# Patient Record
Sex: FEMALE | Race: ASIAN | NOT HISPANIC OR LATINO | Employment: FULL TIME | URBAN - METROPOLITAN AREA
[De-identification: names, ages, dates, MRNs, and addresses within clinical notes are randomized per-mention and may not be internally consistent; named-entity substitution may affect disease eponyms.]

---

## 2017-02-27 ENCOUNTER — TELEPHONE (OUTPATIENT)
Dept: OBSTETRICS AND GYNECOLOGY | Facility: CLINIC | Age: 47
End: 2017-02-27

## 2017-02-27 DIAGNOSIS — Z12.31 VISIT FOR SCREENING MAMMOGRAM: Primary | ICD-10-CM

## 2017-02-27 NOTE — TELEPHONE ENCOUNTER
----- Message from Brendaloco Butler sent at 2/27/2017 12:40 PM CST -----  Contact: self  Pt request her mammogram order to be place so she can get it done on the same day as her annual appointment. Thanks!       Order for screening mammogram in    Keny Helms MD

## 2017-03-01 ENCOUNTER — TELEPHONE (OUTPATIENT)
Dept: OBSTETRICS AND GYNECOLOGY | Facility: CLINIC | Age: 47
End: 2017-03-01

## 2017-03-01 NOTE — TELEPHONE ENCOUNTER
----- Message from Brendaloco Butler sent at 2/27/2017 12:40 PM CST -----  Contact: self  Pt request her mammogram order to be place so she can get it done on the same day as her annual appointment. Thanks!       LM to inform pt appt has been scheduled per request. terri

## 2017-03-08 ENCOUNTER — OFFICE VISIT (OUTPATIENT)
Dept: OBSTETRICS AND GYNECOLOGY | Facility: CLINIC | Age: 47
End: 2017-03-08
Payer: COMMERCIAL

## 2017-03-08 ENCOUNTER — HOSPITAL ENCOUNTER (OUTPATIENT)
Dept: RADIOLOGY | Facility: HOSPITAL | Age: 47
Discharge: HOME | End: 2017-03-08
Attending: OBSTETRICS & GYNECOLOGY
Payer: COMMERCIAL

## 2017-03-08 VITALS
WEIGHT: 151.69 LBS | BODY MASS INDEX: 29.78 KG/M2 | DIASTOLIC BLOOD PRESSURE: 72 MMHG | HEIGHT: 60 IN | SYSTOLIC BLOOD PRESSURE: 130 MMHG | TEMPERATURE: 99 F

## 2017-03-08 DIAGNOSIS — Z01.419 WELL WOMAN EXAM WITH ROUTINE GYNECOLOGICAL EXAM: Primary | ICD-10-CM

## 2017-03-08 DIAGNOSIS — Z12.31 VISIT FOR SCREENING MAMMOGRAM: ICD-10-CM

## 2017-03-08 PROCEDURE — 99396 PREV VISIT EST AGE 40-64: CPT | Mod: S$GLB,,, | Performed by: OBSTETRICS & GYNECOLOGY

## 2017-03-08 PROCEDURE — 77067 SCR MAMMO BI INCL CAD: CPT | Mod: 26,,, | Performed by: RADIOLOGY

## 2017-03-08 PROCEDURE — 99999 PR PBB SHADOW E&M-EST. PATIENT-LVL III: CPT | Mod: PBBFAC,,, | Performed by: OBSTETRICS & GYNECOLOGY

## 2017-03-08 PROCEDURE — 77067 SCR MAMMO BI INCL CAD: CPT | Mod: TC

## 2017-03-08 PROCEDURE — 77063 BREAST TOMOSYNTHESIS BI: CPT | Mod: 26,,, | Performed by: RADIOLOGY

## 2017-03-08 NOTE — PROGRESS NOTES
Subjective:       Patient ID: Carrie Carpenter is a 46 y.o. female.    Chief Complaint:  Gynecologic Exam      History of Present Illness  HPI  Annual Exam-Premenopausal  Patient presents for annual exam. The patient has no complaints today except for occasional pain on right side of Pfannenstiel incision. The patient is sexually active. GYN screening history: last pap: approximate date 2016 and was normal and last mammogram: approximate date 3/8/2017 and was normal. The patient wears seatbelts: yes. The patient participates in regular exercise: no. Has the patient ever been transfused or tattooed?: no. The patient reports that there is not domestic violence in her life.    Patient does not want to be on oral contraceptive.  Just using condoms.      GYN & OB History  Patient's last menstrual period was 2017 (exact date).   Date of Last Pap: 2016    OB History    Para Term  AB SAB TAB Ectopic Multiple Living   5 1 1  4 1 3   1      # Outcome Date GA Lbr Kb/2nd Weight Sex Delivery Anes PTL Lv   5 Term 00 40w0d  2.95 kg (6 lb 8.1 oz) M CS-LTranv Gen N Y   4 TAB            3 TAB            2 TAB            1 SAB                 History reviewed. No pertinent past medical history.    Past Surgical History:   Procedure Laterality Date     SECTION, LOW TRANSVERSE      CHOLECYSTECTOMY         Family History   Problem Relation Age of Onset    Hypertension Mother     Hyperlipidemia Mother     Diabetes Father     Hypertension Father        Social History     Social History    Marital status:      Spouse name: N/A    Number of children: N/A    Years of education: N/A     Social History Main Topics    Smoking status: Never Smoker    Smokeless tobacco: Never Used    Alcohol use No    Drug use: No    Sexual activity: Yes     Partners: Male     Birth control/ protection: None     Other Topics Concern    None     Social History Narrative     since     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       No current facility-administered medications for this visit.        Review of patient's allergies indicates:  No Known Allergies    Review of Systems  Review of Systems   Constitutional: Negative for activity change, appetite change, chills, fatigue, fever and unexpected weight change.   HENT: Negative for mouth sores.    Respiratory: Negative for cough, shortness of breath and wheezing.    Cardiovascular: Negative for chest pain and palpitations.   Gastrointestinal: Negative for abdominal pain, bloating, blood in stool, constipation, nausea and vomiting.   Endocrine: Negative for diabetes and hot flashes.   Genitourinary: Negative for dyspareunia, dysuria, frequency, hematuria, menorrhagia, menstrual problem, pelvic pain, urgency, vaginal bleeding, vaginal discharge, vaginal pain, dysmenorrhea, urinary incontinence, postcoital bleeding and vaginal odor.   Musculoskeletal: Negative for back pain and myalgias.   Skin:  Negative for rash.   Neurological: Negative for seizures and headaches.   Psychiatric/Behavioral: Negative for depression and sleep disturbance. The patient is not nervous/anxious.    Breast: Negative for breast mass, breast pain and nipple discharge          Objective:    Physical Exam:   Constitutional: She appears well-developed and well-nourished. No distress.    HENT:   Head: Normocephalic and atraumatic.    Eyes: EOM are normal.    Neck: Normal range of motion.     Pulmonary/Chest: Effort normal. No respiratory distress.   Breasts: Non-tender, no engorgement, no masses, no retraction, no discharge. Negative for lymphadenopathy.         Abdominal: Soft. She exhibits no distension. There is no tenderness. There is no rebound and no guarding.   Pfannenstiel incision healing well      Genitourinary: Vagina normal and uterus normal. No vaginal discharge found.   Genitourinary Comments: Vulva without any obvious lesions.  Vaginal vault with good support.  Minimal discharge noted.  No obvious lesion.  Cervix is without any cervical motion tenderness.  No obvious lesion.  Uterus is small, non-tender, normal contour.  Adnexa is without any masses or tenderness.           Musculoskeletal: Normal range of motion.       Neurological: She is alert.    Skin: Skin is warm and dry.    Psychiatric: She has a normal mood and affect.          Assessment:        1. Well woman exam with routine gynecological exam             Plan:          I have discussed with the patient her condition.  Monthly breast examination was instructed, discussed, and encouraged.  Patient was encouraged to consume a low-calorie, low fat diet, and to increase of physical activity.  Healthy habits encouraged.  A Pap smear was NOT performed.  Mammogram was not ordered because it was done today.  Gonorrhea and Chlamydia testing not performed.  HIV test not ordered.  Patient is to continue her medication as prescribed by her primary care physician.  She will come back to see me in one year for her annual visit.  She can come back to see me sooner as necessary.  All of her questions were answered appropriately to her satisfaction.    She will continue using condoms as necessary.

## 2017-03-08 NOTE — MR AVS SNAPSHOT
"    [facility]  [address]  [address]  [place] [address]  Phone: [phone]                  Carrie Carpenter   3/8/2017 3:20 PM   Office Visit    Description:  Female : 1970   Provider:  Keny Helms MD   Department:  [facility]           Reason for Visit     Gynecologic Exam           Diagnoses this Visit        Comments    Well woman exam with routine gynecological exam    -  Primary            To Do List           Goals (5 Years of Data)     None      Follow-Up and Disposition     Return in about 1 year (around 3/8/2018).      [facility] On Call     [facility] On Call Nurse Care Line -  Assistance  Registered nurses in the [facility] provide clinical advisement, health education, appointment booking, and other advisory services.  Call for this free service at [phone].             Medications           Message regarding Medications     Verify the changes and/or additions to your medication regime listed below are the same as discussed with your clinician today.  If any of these changes or additions are incorrect, please notify your healthcare provider.             Verify that the below list of medications is an accurate representation of the medications you are currently taking.  If none reported, the list may be blank. If incorrect, please contact your healthcare provider. Carry this list with you in case of emergency.           Current Medications     hydrOXYzine (ATARAX) 25 MG tablet Take 25 mg by mouth 4 (four) times daily.           Clinical Reference Information           Your Vitals Were     BP Temp Height    130/72 (BP Location: Right arm, Patient Position: Standing, BP Method: Manual) 98.8 °F (37.1 °C) (Oral) 5' 0.24" (1.53 m)    Weight Last Period BMI    68.8 kg (151 lb 10.8 oz) 2017 (Exact Date) 29.39 kg/m2      Blood Pressure          Most Recent Value    BP  130/72      Allergies as of 3/8/2017     No Known Allergies      Immunizations " Administered on Date of Encounter - 3/8/2017     None      Language Assistance Services     ATTENTION: Language assistance services are available, free of charge. Please call [phone].      ATENCIÓN: Si habmuna houser, tiene a morrissey disposición servicios gratuitos de asistencia lingüística. Llame al [phone].     CHÚ Ý: N?u b?n nói Ti?ng Vi?t, có các d?ch v? h? tr? ngôn ng? mi?n phí dành cho b?n. G?i s? [phone].         [facility] complies with applicable Federal civil rights laws and does not discriminate on the basis of race, color, national origin, age, disability, or sex.

## 2017-11-07 ENCOUNTER — OFFICE VISIT (OUTPATIENT)
Dept: OBSTETRICS AND GYNECOLOGY | Facility: CLINIC | Age: 47
End: 2017-11-07
Payer: COMMERCIAL

## 2017-11-07 VITALS
WEIGHT: 153 LBS | BODY MASS INDEX: 30.04 KG/M2 | TEMPERATURE: 99 F | HEIGHT: 60 IN | DIASTOLIC BLOOD PRESSURE: 74 MMHG | SYSTOLIC BLOOD PRESSURE: 122 MMHG

## 2017-11-07 DIAGNOSIS — N92.6 IRREGULAR MENSES: Primary | ICD-10-CM

## 2017-11-07 DIAGNOSIS — N92.1 MENORRHAGIA WITH IRREGULAR CYCLE: ICD-10-CM

## 2017-11-07 LAB
B-HCG UR QL: NEGATIVE
CTP QC/QA: YES

## 2017-11-07 PROCEDURE — 81025 URINE PREGNANCY TEST: CPT | Mod: S$GLB,,, | Performed by: OBSTETRICS & GYNECOLOGY

## 2017-11-07 PROCEDURE — 99213 OFFICE O/P EST LOW 20 MIN: CPT | Mod: S$GLB,,, | Performed by: OBSTETRICS & GYNECOLOGY

## 2017-11-07 PROCEDURE — 99999 PR PBB SHADOW E&M-EST. PATIENT-LVL III: CPT | Mod: PBBFAC,,, | Performed by: OBSTETRICS & GYNECOLOGY

## 2017-11-07 RX ORDER — NAPROXEN 500 MG/1
500 TABLET ORAL 2 TIMES DAILY WITH MEALS
Qty: 60 TABLET | Refills: 1 | Status: SHIPPED | OUTPATIENT
Start: 2017-11-07 | End: 2017-12-07

## 2017-11-07 NOTE — PROGRESS NOTES
Subjective:       Patient ID: Carrie Carpenter is a 47 y.o. female.    Chief Complaint:  Menstrual Problem (Pt complaining of bleeding for 1 month)      History of Present Illness  HPI  Patient comes in today complaining of having heavy menses    No cycle for the past three months.  But it started again on 10/15/2017.  Heavy since  Denies dizziness  No fever or chills.  No nausea or vomiting.  No pain.    Same partner.      GYN & OB History  Patient's last menstrual period was 10/15/2017 (exact date).   Date of Last Pap: 2016    OB History    Para Term  AB Living   5 1 1   4 1   SAB TAB Ectopic Multiple Live Births   1 3     1      # Outcome Date GA Lbr Kb/2nd Weight Sex Delivery Anes PTL Lv   5 Term 00 40w0d  2.95 kg (6 lb 8.1 oz) M CS-LTranv Gen N NOAH   4 TAB            3 TAB            2 TAB            1 SAB                 History reviewed. No pertinent past medical history.    Past Surgical History:   Procedure Laterality Date     SECTION, LOW TRANSVERSE      CHOLECYSTECTOMY         Family History   Problem Relation Age of Onset    Hypertension Mother     Hyperlipidemia Mother     Diabetes Father     Hypertension Father        Social History     Social History    Marital status:      Spouse name: N/A    Number of children: N/A    Years of education: N/A     Social History Main Topics    Smoking status: Never Smoker    Smokeless tobacco: Never Used    Alcohol use No    Drug use: No    Sexual activity: Yes     Partners: Male     Birth control/ protection: None     Other Topics Concern    None     Social History Narrative     since     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       No current facility-administered medications for this visit.        Review of patient's  allergies indicates:  No Known Allergies    Review of Systems  Review of Systems   Constitutional: Negative for activity change, appetite change, chills, fatigue, fever and unexpected weight change.   HENT: Negative for mouth sores.    Respiratory: Negative for cough, shortness of breath and wheezing.    Cardiovascular: Negative for chest pain and palpitations.   Gastrointestinal: Negative for abdominal pain, bloating, blood in stool, constipation, nausea and vomiting.   Endocrine: Negative for diabetes and hot flashes.   Genitourinary: Positive for menstrual problem and vaginal bleeding. Negative for dyspareunia, dysuria, frequency, hematuria, menorrhagia, pelvic pain, urgency, vaginal discharge, vaginal pain, dysmenorrhea, urinary incontinence, postcoital bleeding and vaginal odor.   Musculoskeletal: Negative for back pain and myalgias.   Skin:  Negative for rash.   Neurological: Negative for seizures and headaches.   Psychiatric/Behavioral: Negative for depression and sleep disturbance. The patient is not nervous/anxious.    Breast: Negative for breast mass, breast pain and nipple discharge          Objective:    Physical Exam:   Constitutional: She appears well-developed and well-nourished. No distress.    HENT:   Head: Normocephalic and atraumatic.    Eyes: EOM are normal.    Neck: Normal range of motion.     Pulmonary/Chest: Effort normal. No respiratory distress.   Breasts: Non-tender, no engorgement, no masses, no retraction, no discharge. Negative for lymphadenopathy.         Abdominal: Soft. She exhibits no distension. There is no tenderness. There is no rebound and no guarding.     Genitourinary: Vagina normal and uterus normal. No vaginal discharge found.   Genitourinary Comments: Vulva without any obvious lesions.  Vaginal vault with good support.  Moderate bleeding with clots noted.  No obvious lesion.  Cervix is without any cervical motion tenderness.  No obvious lesion.  Uterus is about 8-10 weeks,  non-tender, normal contour.  Adnexa is without any masses or tenderness.           Musculoskeletal: Normal range of motion.       Neurological: She is alert.    Skin: Skin is warm and dry.    Psychiatric: She has a normal mood and affect.          Assessment:        1. Irregular menses    2. Menorrhagia with irregular cycle             Plan:      I have discussed with the patient regarding her condition  Will try oral contraceptive today, Ovcon 35.  Non-smoker  Naproxen as needed  Fergon bid    Back in about 2 months

## 2017-11-13 NOTE — TELEPHONE ENCOUNTER
----- Message from Riki Greene sent at 11/13/2017  2:24 PM CST -----  Contact: SON /Mary Jane [phone]  Calling regarding Pt still feels ill since appt.,last week. Wants to know what protocol should they take      .11/13/2017   Patient offered appointment for tomorrow

## 2017-11-13 NOTE — TELEPHONE ENCOUNTER
Attempt to contact patient, patient could not really understand directions for medication. Informed [place] to contact patient.

## 2017-11-13 NOTE — TELEPHONE ENCOUNTER
----- Message from Jorge Luis Scherer sent at 11/13/2017 11:24 AM CST -----  Contact: self  Pt states she is having problems with the medications Dr Helms prescribed. Please contact her at [phone].    Thanks

## 2017-11-14 NOTE — TELEPHONE ENCOUNTER
Called and spoke with pt this morning to ask about her well being.  Pt states that on her last visit 11/7/17, she was given 3 medications and she took all 3 starting on that day.  The medications made her nauseous and weak.  She didn't like the feeling so she stopped for 2 days now.  Pt still will bleeding issue since medications were given to treat that problem.  Pt will start back on OCP only for today to see how she tolerates since this is her first time ever taking OCP of any kind.  Pt will be contacted at the end of day for an update. terri

## 2017-11-28 ENCOUNTER — OFFICE VISIT (OUTPATIENT)
Dept: OBSTETRICS AND GYNECOLOGY | Facility: CLINIC | Age: 47
End: 2017-11-28
Payer: COMMERCIAL

## 2017-11-28 VITALS
WEIGHT: 148.56 LBS | DIASTOLIC BLOOD PRESSURE: 76 MMHG | BODY MASS INDEX: 29.17 KG/M2 | TEMPERATURE: 99 F | SYSTOLIC BLOOD PRESSURE: 138 MMHG | HEIGHT: 60 IN

## 2017-11-28 DIAGNOSIS — N92.1 MENORRHAGIA WITH IRREGULAR CYCLE: Primary | ICD-10-CM

## 2017-11-28 PROCEDURE — 99213 OFFICE O/P EST LOW 20 MIN: CPT | Mod: S$GLB,,, | Performed by: OBSTETRICS & GYNECOLOGY

## 2017-11-28 PROCEDURE — 99999 PR PBB SHADOW E&M-EST. PATIENT-LVL III: CPT | Mod: PBBFAC,,, | Performed by: OBSTETRICS & GYNECOLOGY

## 2017-11-28 NOTE — PROGRESS NOTES
"  Subjective:       Patient ID: Carrie Carpenter is a 47 y.o. female.    Chief Complaint:  Follow-up (bleeding on birth control)      History of Present Illness  HPI  Patient comes in today for follow-up  Seen on 2017 with menorrhagia  Placed on naproxen, iron supplement and Ovcon-35  But taking "all of these pills" gave her nausea and dizziness    Told to stop all meds except for oral contraceptive.  Feeling better.  But still bleeding    No other complaint.    GYN & OB History  Patient's last menstrual period was 10/15/2017 (exact date).   Date of Last Pap: 2016    OB History    Para Term  AB Living   5 1 1   4 1   SAB TAB Ectopic Multiple Live Births   1 3     1      # Outcome Date GA Lbr Kb/2nd Weight Sex Delivery Anes PTL Lv   5 Term 00 40w0d  2.95 kg (6 lb 8.1 oz) M CS-LTranv Gen N NOAH   4 TAB            3 TAB            2 TAB            1 SAB                 History reviewed. No pertinent past medical history.    Past Surgical History:   Procedure Laterality Date     SECTION, LOW TRANSVERSE      CHOLECYSTECTOMY         Family History   Problem Relation Age of Onset    Hypertension Mother     Hyperlipidemia Mother     Diabetes Father     Hypertension Father        Social History     Social History    Marital status:      Spouse name: N/A    Number of children: N/A    Years of education: N/A     Social History Main Topics    Smoking status: Never Smoker    Smokeless tobacco: Never Used    Alcohol use No    Drug use: No    Sexual activity: Yes     Partners: Male     Birth control/ protection: None     Other Topics Concern    None     Social History Narrative     since     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      hydrOXYzine " (ATARAX) 25 MG tablet Take 25 mg by mouth 4 (four) times daily.      naproxen (NAPROSYN) 500 MG tablet Take 1 tablet (500 mg total) by mouth 2 (two) times daily with meals. 60 tablet 1    norethindrone-ethinyl estradiol (OVCON) 0.4-35 mg-mcg per tablet Take 1 tablet by mouth once daily. 30 tablet 6     No current facility-administered medications for this visit.        Review of patient's allergies indicates:  No Known Allergies    Review of Systems  Review of Systems   Constitutional: Negative for activity change, appetite change, chills, fatigue, fever and unexpected weight change.   HENT: Negative for mouth sores.    Respiratory: Negative for cough, shortness of breath and wheezing.    Cardiovascular: Negative for chest pain and palpitations.   Gastrointestinal: Negative for abdominal pain, bloating, blood in stool, constipation, nausea and vomiting.   Endocrine: Negative for diabetes and hot flashes.   Genitourinary: Positive for menstrual problem and vaginal bleeding. Negative for dyspareunia, dysuria, frequency, hematuria, menorrhagia, pelvic pain, urgency, vaginal discharge, vaginal pain, dysmenorrhea, urinary incontinence, postcoital bleeding and vaginal odor.   Musculoskeletal: Negative for back pain and myalgias.   Skin:  Negative for rash.   Neurological: Negative for seizures and headaches.   Psychiatric/Behavioral: Negative for depression and sleep disturbance. The patient is not nervous/anxious.    Breast: Negative for breast mass, breast pain and nipple discharge          Objective:    Physical Exam:   Constitutional: She appears well-developed and well-nourished. No distress.    HENT:   Head: Normocephalic and atraumatic.    Eyes: EOM are normal.    Neck: Normal range of motion.     Pulmonary/Chest: Effort normal. No respiratory distress.   Breasts: Non-tender, no engorgement, no masses, no retraction, no discharge. Negative for lymphadenopathy.         Abdominal: Soft. She exhibits no distension.  There is no tenderness. There is no rebound and no guarding.     Genitourinary: Vagina normal and uterus normal. No vaginal discharge found.   Genitourinary Comments: Vulva without any obvious lesions.  Vaginal vault with good support.  Minimal bloody discharge noted.  No obvious lesion.  Cervix is without any cervical motion tenderness.  No obvious lesion.  Uterus is about 8 weeks, non-tender, normal contour.  Adnexa is without any masses or tenderness.           Musculoskeletal: Normal range of motion.       Neurological: She is alert.    Skin: Skin is warm and dry.    Psychiatric: She has a normal mood and affect.          Assessment:        1. Menorrhagia with irregular cycle             Plan:      I have discussed with the patient regarding her condition  Continue with iron and oral contraceptive  Pelvic ultrasound  Back next week for endometrial biopsy  Back sooner if needed.

## 2017-12-01 ENCOUNTER — TELEPHONE (OUTPATIENT)
Dept: OBSTETRICS AND GYNECOLOGY | Facility: CLINIC | Age: 47
End: 2017-12-01

## 2017-12-01 NOTE — TELEPHONE ENCOUNTER
----- Message from Sarah Cui sent at 12/1/2017  3:59 PM CST -----  Contact: [phone]  Pt is needing to reschedule her procedure for in the next year Please call pt at your earliest convenience.  Thanks !

## 2017-12-01 NOTE — TELEPHONE ENCOUNTER
Both Pt and her  would like to hold off on the pelvic ultrasound and other testing until the beginning of next year due to deductible reasoning. Pt would like to know if that would be alright with the doctor. I told pt that it would be alright and she should continue taking her birth control while we wait. Pt voiced understanding. terri

## 2018-04-09 ENCOUNTER — OFFICE VISIT (OUTPATIENT)
Dept: OBSTETRICS AND GYNECOLOGY | Facility: CLINIC | Age: 48
End: 2018-04-09
Payer: COMMERCIAL

## 2018-04-09 VITALS
HEIGHT: 60 IN | WEIGHT: 151.25 LBS | DIASTOLIC BLOOD PRESSURE: 62 MMHG | SYSTOLIC BLOOD PRESSURE: 128 MMHG | BODY MASS INDEX: 29.7 KG/M2 | TEMPERATURE: 99 F

## 2018-04-09 DIAGNOSIS — Z01.419 WELL WOMAN EXAM WITH ROUTINE GYNECOLOGICAL EXAM: Primary | ICD-10-CM

## 2018-04-09 DIAGNOSIS — Z12.31 VISIT FOR SCREENING MAMMOGRAM: ICD-10-CM

## 2018-04-09 PROCEDURE — 99999 PR PBB SHADOW E&M-EST. PATIENT-LVL III: CPT | Mod: PBBFAC,,, | Performed by: OBSTETRICS & GYNECOLOGY

## 2018-04-09 PROCEDURE — 99396 PREV VISIT EST AGE 40-64: CPT | Mod: S$GLB,,, | Performed by: OBSTETRICS & GYNECOLOGY

## 2018-04-09 NOTE — PROGRESS NOTES
Subjective:       Patient ID: Carrie Carpenter is a 47 y.o. female.    Chief Complaint:  Gynecologic Exam (Last pap was 16 and last mmg was 17)      History of Present Illness  HPI  Annual Exam-Premenopausal  Patient presents for annual exam. The patient has no complaints today. The patient is sexually active. GYN screening history: last pap: approximate date 2016 and was normal and last mammogram: approximate date 3/8/2017 and was normal. The patient wears seatbelts: yes. The patient participates in regular exercise: no. Has the patient ever been transfused or tattooed?: no. The patient reports that there is not domestic violence in her life.    Status post  section.    GYN & OB History  Patient's last menstrual period was 2018 (exact date).   Date of Last Pap: 2016    OB History    Para Term  AB Living   5 1 1   4 1   SAB TAB Ectopic Multiple Live Births   1 3     1      # Outcome Date GA Lbr Kb/2nd Weight Sex Delivery Anes PTL Lv   5 Term 00 40w0d  2.95 kg (6 lb 8.1 oz) M CS-LTranv Gen N NOAH   4 TAB            3 TAB            2 TAB            1 SAB                 History reviewed. No pertinent past medical history.    Past Surgical History:   Procedure Laterality Date     SECTION, LOW TRANSVERSE      CHOLECYSTECTOMY         Family History   Problem Relation Age of Onset    Hypertension Mother     Hyperlipidemia Mother     Diabetes Father     Hypertension Father        Social History     Social History    Marital status:      Spouse name: N/A    Number of children: N/A    Years of education: N/A     Social History Main Topics    Smoking status: Never Smoker    Smokeless tobacco: Never Used    Alcohol use No    Drug use: No    Sexual activity: Yes     Partners: Male     Birth control/ protection: None     Other Topics Concern    None     Social History Narrative     since     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      hydrOXYzine (ATARAX) 25 MG tablet Take 25 mg by mouth 4 (four) times daily.       No current facility-administered medications for this visit.        Review of patient's allergies indicates:  No Known Allergies    Review of Systems  Review of Systems   Constitutional: Negative for activity change, appetite change, chills, fatigue, fever and unexpected weight change.   HENT: Negative for mouth sores.    Respiratory: Negative for cough, shortness of breath and wheezing.    Cardiovascular: Negative for chest pain and palpitations.   Gastrointestinal: Negative for abdominal pain, bloating, blood in stool, constipation, nausea and vomiting.   Endocrine: Negative for diabetes and hot flashes.   Genitourinary: Negative for dyspareunia, dysuria, frequency, hematuria, menorrhagia, menstrual problem, pelvic pain, urgency, vaginal bleeding, vaginal discharge, vaginal pain, dysmenorrhea, urinary incontinence, postcoital bleeding and vaginal odor.   Musculoskeletal: Negative for back pain and myalgias.   Skin:  Negative for rash.   Neurological: Negative for seizures and headaches.   Psychiatric/Behavioral: Negative for depression and sleep disturbance. The patient is not nervous/anxious.    Breast: Negative for breast mass, breast pain and nipple discharge          Objective:    Physical Exam:   Constitutional: She appears well-developed and well-nourished. No distress.    HENT:   Head: Normocephalic and atraumatic.    Eyes: EOM are normal.    Neck: Normal range of motion.     Pulmonary/Chest: Effort normal. No respiratory distress.   Breasts: Non-tender, no engorgement, no masses, no retraction, no discharge. Negative for lymphadenopathy.         Abdominal: Soft. She exhibits no distension. There is no  tenderness. There is no rebound and no guarding.   Pfannenstiel scar     Genitourinary: Vagina normal and uterus normal. No vaginal discharge found.   Genitourinary Comments: Vulva without any obvious lesions.  Vaginal vault with good support.  Minimal white discharge noted.  No obvious lesion.  Cervix is without any cervical motion tenderness.  No obvious lesion.  Uterus is small, non-tender, normal contour.  Adnexa is without any masses or tenderness.           Musculoskeletal: Normal range of motion.       Neurological: She is alert.    Skin: Skin is warm and dry.    Psychiatric: She has a normal mood and affect.          Assessment:        1. Well woman exam with routine gynecological exam    2. Visit for screening mammogram             Plan:           I have discussed with the patient her condition.  Monthly breast examination was instructed, discussed, and encouraged.  Patient was encouraged to consume a low-calorie, low fat diet, and to increase of physical activity.  Healthy habits encouraged.  A Pap smear was NOT performed.  Mammogram was ordered because of the combination of her age and risk factors.  Gonorrhea and Chlamydia testing not performed.  HIV test not ordered.    Patient no longer with menorrhagia.  Does not take oral contraceptive.  She will come back to see me in one year for her annual visit.  She can come back to see me sooner as necessary.  All of her questions were answered appropriately to her satisfaction.

## 2019-02-13 ENCOUNTER — TELEPHONE (OUTPATIENT)
Dept: OBSTETRICS AND GYNECOLOGY | Facility: CLINIC | Age: 49
End: 2019-02-13

## 2019-02-13 NOTE — TELEPHONE ENCOUNTER
Called and explained to pt that her appt on 4/15/19 needs to be a little later than 9:00 am due to Dr. Helms assisting another doctor in surgery.  Pt voiced understanding and agreed to change appt time to 9:40 am and her mother for 10:00 am. jtn    ----- Message from Mitra Lindsya sent at 2/12/2019  2:09 PM CST -----  Contact: Self   Patient is asking to speak with the office on why she has to reschedule. Please call at [phone]. [place]  requested.

## 2019-04-15 ENCOUNTER — OFFICE VISIT (OUTPATIENT)
Dept: OBSTETRICS AND GYNECOLOGY | Facility: CLINIC | Age: 49
End: 2019-04-15
Payer: COMMERCIAL

## 2019-04-15 VITALS
BODY MASS INDEX: 29.61 KG/M2 | SYSTOLIC BLOOD PRESSURE: 130 MMHG | WEIGHT: 150.81 LBS | HEIGHT: 60 IN | DIASTOLIC BLOOD PRESSURE: 82 MMHG

## 2019-04-15 DIAGNOSIS — Z12.31 VISIT FOR SCREENING MAMMOGRAM: ICD-10-CM

## 2019-04-15 DIAGNOSIS — Z01.419 WELL WOMAN EXAM WITH ROUTINE GYNECOLOGICAL EXAM: Primary | ICD-10-CM

## 2019-04-15 DIAGNOSIS — N92.6 MISSED PERIOD: ICD-10-CM

## 2019-04-15 LAB
B-HCG UR QL: NEGATIVE
CTP QC/QA: YES

## 2019-04-15 PROCEDURE — 87624 HPV HI-RISK TYP POOLED RSLT: CPT

## 2019-04-15 PROCEDURE — 99396 PREV VISIT EST AGE 40-64: CPT | Mod: S$GLB,,, | Performed by: OBSTETRICS & GYNECOLOGY

## 2019-04-15 PROCEDURE — 81025 POCT URINE PREGNANCY: ICD-10-PCS | Mod: S$GLB,,, | Performed by: OBSTETRICS & GYNECOLOGY

## 2019-04-15 PROCEDURE — 99999 PR PBB SHADOW E&M-EST. PATIENT-LVL III: CPT | Mod: PBBFAC,,, | Performed by: OBSTETRICS & GYNECOLOGY

## 2019-04-15 PROCEDURE — 99999 PR PBB SHADOW E&M-EST. PATIENT-LVL III: ICD-10-PCS | Mod: PBBFAC,,, | Performed by: OBSTETRICS & GYNECOLOGY

## 2019-04-15 PROCEDURE — 99396 PR PREVENTIVE VISIT,EST,40-64: ICD-10-PCS | Mod: S$GLB,,, | Performed by: OBSTETRICS & GYNECOLOGY

## 2019-04-15 PROCEDURE — 81025 URINE PREGNANCY TEST: CPT | Mod: S$GLB,,, | Performed by: OBSTETRICS & GYNECOLOGY

## 2019-04-15 PROCEDURE — 88175 CYTOPATH C/V AUTO FLUID REDO: CPT

## 2019-04-15 NOTE — PROGRESS NOTES
Subjective:       Patient ID: Carrie Carpenter is a 48 y.o. female.    Chief Complaint:  Gynecologic Exam (Last normal pap was 16 and last mmg was 17)      History of Present Illness  HPI  Annual Exam-Premenopausal  Patient presents for annual exam. The patient has no complaints today. The patient is sexually active. GYN screening history: last pap: approximate date 2016 and was normal and last mammogram: approximate date 3/18/2017 and was normal. The patient wears seatbelts: yes. The patient participates in regular exercise: No. Has the patient ever been transfused or tattooed?: yes. The patient reports that there is not domestic violence in her life.    Missed her cycle for the first time last month.  But UPT is negative.  Occasional right lower quadrant pain.  Ultrasound at her PCP's office was normal in 2019.      GYN & OB History  Patient's last menstrual period was 2019 (exact date).   Date of Last Pap: 2016    OB History    Para Term  AB Living   5 1 1   4 1   SAB TAB Ectopic Multiple Live Births   1 3     1      # Outcome Date GA Lbr Kb/2nd Weight Sex Delivery Anes PTL Lv   5 Term 00 40w0d  2.95 kg (6 lb 8.1 oz) M CS-LTranv Gen N NOAH   4 TAB            3 TAB            2 TAB            1 SAB              History reviewed. No pertinent past medical history.    Past Surgical History:   Procedure Laterality Date     SECTION, LOW TRANSVERSE      CHOLECYSTECTOMY         Family History   Problem Relation Age of Onset    Hypertension Mother     Hyperlipidemia Mother     Diabetes Father     Hypertension Father        Social History     Socioeconomic History    Marital status:      Spouse name: Not on file    Number of children: Not on file    Years of education: Not on file    Highest education level: Not on file   Occupational History    Not on file   Social Needs    Financial resource strain: Not on file    Food insecurity:      Worry: Not on file     Inability: Not on file    Transportation needs:     Medical: Not on file     Non-medical: Not on file   Tobacco Use    Smoking status: Never Smoker    Smokeless tobacco: Never Used   Substance and Sexual Activity    Alcohol use: No    Drug use: No    Sexual activity: Yes     Partners: Male     Birth control/protection: None   Lifestyle    Physical activity:     Days per week: Not on file     Minutes per session: Not on file    Stress: Not on file   Relationships    Social connections:     Talks on phone: Not on file     Gets together: Not on file     Attends [employer]ism service: Not on file     Active member of club or organization: Not on file     Attends meetings of clubs or organizations: Not on file     Relationship status: Not on file   Other Topics Concern    Not on file   Social History Narrative     since 1998    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      hydrOXYzine (ATARAX) 25 MG tablet Take 25 mg by mouth 4 (four) times daily.       No current facility-administered medications for this visit.        Review of patient's allergies indicates:  No Known Allergies    Review of Systems  Review of Systems   Constitutional: Negative for activity change, appetite change, chills, fatigue, fever and unexpected weight change.   HENT: Negative for mouth sores.    Respiratory: Negative for cough, shortness of breath and wheezing.    Cardiovascular: Negative for chest pain and palpitations.   Gastrointestinal: Positive for abdominal pain. Negative for bloating, blood in stool, constipation, nausea and vomiting.   Endocrine: Negative for diabetes and hot flashes.   Genitourinary: Positive for menstrual problem and pelvic pain. Negative for dysmenorrhea, dyspareunia,  dysuria, frequency, hematuria, menorrhagia, urgency, vaginal bleeding, vaginal discharge, vaginal pain, urinary incontinence, postcoital bleeding and vaginal odor.   Musculoskeletal: Negative for back pain and myalgias.   Integumentary:  Negative for rash, breast mass and nipple discharge.   Neurological: Negative for seizures and headaches.   Psychiatric/Behavioral: Negative for depression and sleep disturbance. The patient is not nervous/anxious.    Breast: Negative for mass, mastodynia and nipple discharge          Objective:    Physical Exam:   Constitutional: She appears well-developed and well-nourished. No distress.    HENT:   Head: Normocephalic and atraumatic.    Eyes: EOM are normal.    Neck: Normal range of motion.     Pulmonary/Chest: Effort normal. No respiratory distress.   Breasts: Non-tender, no engorgement, no masses, no retraction, no discharge. Negative for lymphadenopathy.         Abdominal: Soft. She exhibits no distension. There is no tenderness. There is no rebound and no guarding.     Genitourinary: Vagina normal and uterus normal. No vaginal discharge found.   Genitourinary Comments: Vulva without any obvious lesions.  Vaginal vault with good support.  Minimal white discharge noted.  No obvious lesion.  Cervix is without any cervical motion tenderness.  No obvious lesion.  Uterus is small, retroverted, non-tender, normal contour.  Adnexa is without any masses or tenderness.           Musculoskeletal: Normal range of motion.       Neurological: She is alert.    Skin: Skin is warm and dry.    Psychiatric: She has a normal mood and affect.          Assessment:        1. Well woman exam with routine gynecological exam    2. Visit for screening mammogram    3. Missed period              Plan:          I have discussed with the patient her condition.  Monthly breast examination was instructed, discussed, and encouraged.  Patient was encouraged to consume a low-calorie, low fat diet, and to  increase of physical activity.  Healthy habits encouraged.  A Pap smear was performed along with HR-HPV according to the USPSTF recommendations.  Mammogram was ordered because of the combination of her age and risk factors, according to ACOG guidelines.  Gonorrhea and Chlamydia testing not performed;  HIV test not ordered, again according to guidelines.  Colonoscopy discussed according to ACS guideline.  We also discussed her obstetric history and CV risks.   Patient is to continue her medications as prescribed.  She will come back to see me in one year for her annual visit.  She can come back to see me sooner as necessary.  All of her questions were answered appropriately to her satisfaction.

## 2019-04-19 LAB
HPV HR 12 DNA CVX QL NAA+PROBE: NEGATIVE
HPV16 AG SPEC QL: NEGATIVE
HPV18 DNA SPEC QL NAA+PROBE: NEGATIVE

## 2019-04-22 DIAGNOSIS — N92.6 IRREGULAR MENSES: Primary | ICD-10-CM

## 2019-04-22 RX ORDER — LEVONORGESTREL AND ETHINYL ESTRADIOL 0.1-0.02MG
1 KIT ORAL DAILY
Qty: 30 TABLET | Refills: 6 | Status: SHIPPED | OUTPATIENT
Start: 2019-04-22 | End: 2020-05-26 | Stop reason: SDDI

## 2020-05-26 ENCOUNTER — OFFICE VISIT (OUTPATIENT)
Dept: OBSTETRICS AND GYNECOLOGY | Facility: CLINIC | Age: 50
End: 2020-05-26
Payer: COMMERCIAL

## 2020-05-26 VITALS
BODY MASS INDEX: 29.73 KG/M2 | HEIGHT: 60 IN | SYSTOLIC BLOOD PRESSURE: 132 MMHG | DIASTOLIC BLOOD PRESSURE: 64 MMHG | WEIGHT: 151.44 LBS

## 2020-05-26 DIAGNOSIS — Z01.419 WELL WOMAN EXAM WITH ROUTINE GYNECOLOGICAL EXAM: Primary | ICD-10-CM

## 2020-05-26 DIAGNOSIS — Z12.31 VISIT FOR SCREENING MAMMOGRAM: ICD-10-CM

## 2020-05-26 PROCEDURE — 99999 PR PBB SHADOW E&M-EST. PATIENT-LVL III: CPT | Mod: PBBFAC,,, | Performed by: OBSTETRICS & GYNECOLOGY

## 2020-05-26 PROCEDURE — 99396 PREV VISIT EST AGE 40-64: CPT | Mod: S$GLB,,, | Performed by: OBSTETRICS & GYNECOLOGY

## 2020-05-26 PROCEDURE — 99396 PR PREVENTIVE VISIT,EST,40-64: ICD-10-PCS | Mod: S$GLB,,, | Performed by: OBSTETRICS & GYNECOLOGY

## 2020-05-26 PROCEDURE — 99999 PR PBB SHADOW E&M-EST. PATIENT-LVL III: ICD-10-PCS | Mod: PBBFAC,,, | Performed by: OBSTETRICS & GYNECOLOGY

## 2020-05-26 NOTE — PROGRESS NOTES
Subjective:       Patient ID: Carrie Carpenter is a 49 y.o. female.    Chief Complaint:  Gynecologic Exam (Last pap was 04/15/19 and last mmg was 17)      History of Present Illness  HPI  Annual Exam-Premenopausal  Patient presents for annual exam. The patient has no complaints today. The patient is sexually active. GYN screening history: last pap: approximate date 4/15/2019 and was normal and last mammogram: approximate date 3/8/2017 and was normal. The patient wears seatbelts: yes. The patient participates in regular exercise: no. Has the patient ever been transfused or tattooed?: no. The patient reports that there is not domestic violence in her life.    Has not had her mammogram since 2017.  Still with menses almost monthly  No longer needs OCP    GYN & OB History  Patient's last menstrual period was 2020 (exact date).   Date of Last Pap: 2019    OB History    Para Term  AB Living   5 1 1   4 1   SAB TAB Ectopic Multiple Live Births   1 3     1      # Outcome Date GA Lbr Kb/2nd Weight Sex Delivery Anes PTL Lv   5 Term 00 40w0d  2.95 kg (6 lb 8.1 oz) M CS-LTranv Gen N NOAH   4 TAB            3 TAB            2 TAB            1 SAB              History reviewed. No pertinent past medical history.    Past Surgical History:   Procedure Laterality Date     SECTION, LOW TRANSVERSE  2000    CHOLECYSTECTOMY         Family History   Problem Relation Age of Onset    Hypertension Mother     Hyperlipidemia Mother     Diabetes Father     Hypertension Father        Social History     Socioeconomic History    Marital status:      Spouse name: Not on file    Number of children: Not on file    Years of education: Not on file    Highest education level: Not on file   Occupational History    Not on file   Social Needs    Financial resource strain: Not on file    Food insecurity:     Worry: Not on file     Inability: Not on file    Transportation needs:     Medical:  Not on file     Non-medical: Not on file   Tobacco Use    Smoking status: Never Smoker    Smokeless tobacco: Never Used   Substance and Sexual Activity    Alcohol use: No    Drug use: No    Sexual activity: Yes     Partners: Male     Birth control/protection: None   Lifestyle    Physical activity:     Days per week: Not on file     Minutes per session: Not on file    Stress: Not on file   Relationships    Social connections:     Talks on phone: Not on file     Gets together: Not on file     Attends Mormonism service: Not on file     Active member of club or organization: Not on file     Attends meetings of clubs or organizations: Not on file     Relationship status: Not on file   Other Topics Concern    Not on file   Social History Narrative     since 1998    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      hydrOXYzine (ATARAX) 25 MG tablet Take 25 mg by mouth 4 (four) times daily.       No current facility-administered medications for this visit.        Review of patient's allergies indicates:  No Known Allergies    Review of Systems  Review of Systems   Constitutional: Negative for activity change, appetite change, chills, fatigue, fever and unexpected weight change.   HENT: Negative for mouth sores.    Respiratory: Negative for cough, shortness of breath and wheezing.    Cardiovascular: Negative for chest pain and palpitations.   Gastrointestinal: Negative for abdominal pain, bloating, blood in stool, constipation, nausea and vomiting.   Endocrine: Negative for diabetes and hot flashes.   Genitourinary: Negative for dysmenorrhea, dyspareunia, dysuria, frequency, hematuria, menorrhagia, menstrual problem, pelvic pain, urgency, vaginal bleeding, vaginal discharge, vaginal pain, urinary  incontinence, postcoital bleeding and vaginal odor.   Musculoskeletal: Negative for back pain and myalgias.   Integumentary:  Negative for rash, breast mass and nipple discharge.   Neurological: Negative for seizures and headaches.   Psychiatric/Behavioral: Negative for depression and sleep disturbance. The patient is not nervous/anxious.    Breast: Negative for mass, mastodynia and nipple discharge          Objective:    Physical Exam:   Constitutional: She appears well-developed and well-nourished. No distress.    HENT:   Head: Normocephalic and atraumatic.    Eyes: EOM are normal.    Neck: Normal range of motion.     Pulmonary/Chest: Effort normal. No respiratory distress.   Breasts: Non-tender, no engorgement, no masses, no retraction, no discharge. Negative for lymphadenopathy.         Abdominal: Soft. She exhibits no distension. There is no tenderness. There is no rebound and no guarding.   Pfannenstiel scar     Genitourinary: Vagina normal and uterus normal. No vaginal discharge found.   Genitourinary Comments: Minimal atrophy.  Vulva without any obvious lesions.  Urethral meatus normal size and location without any lesion.  Urethra is non-tender without stricture or discharge.  Bladder is non-tender.  Vaginal vault with good support.  Minimal white discharge noted.  No obvious lesion.  Normal rugation.  Cervix is without any cervical motion tenderness.  No obvious lesion.  Uterus is small, non-tender, normal contour.  Adnexa is without any masses or tenderness.  Perineum without obvious lesion.             Musculoskeletal: Normal range of motion.       Neurological: She is alert.    Skin: Skin is warm and dry.    Psychiatric: She has a normal mood and affect.          Assessment:        1. Well woman exam with routine gynecological exam    2. Visit for screening mammogram              Plan:          I have discussed with the patient her condition.  Monthly breast examination was instructed, discussed, and  encouraged.  Patient was encouraged to consume a low-calorie, low fat diet, and to increase of physical activity.  Healthy habits encouraged.  A Pap smear was NOT performed according to the USPSTF recommendations.  Mammogram was ordered because of the combination of her age and risk factors, according to ACOG guidelines.  Gonorrhea and Chlamydia testing not performed;  HIV test not offered, again according to guidelines.  Colonoscopy discussed according to ACS guideline.  We also discussed her obstetric history and CV risks.   Patient is to continue her medications as prescribed.  She will come back to see me in one year for her annual visit.  She can come back to see me sooner as necessary.  All of her questions were answered appropriately to her satisfaction.

## 2021-05-31 ENCOUNTER — OFFICE VISIT (OUTPATIENT)
Dept: OBSTETRICS AND GYNECOLOGY | Facility: CLINIC | Age: 51
End: 2021-05-31
Payer: COMMERCIAL

## 2021-05-31 VITALS
WEIGHT: 152.13 LBS | SYSTOLIC BLOOD PRESSURE: 152 MMHG | BODY MASS INDEX: 29.86 KG/M2 | DIASTOLIC BLOOD PRESSURE: 80 MMHG | HEIGHT: 60 IN

## 2021-05-31 DIAGNOSIS — Z01.419 WELL WOMAN EXAM WITH ROUTINE GYNECOLOGICAL EXAM: Primary | ICD-10-CM

## 2021-05-31 DIAGNOSIS — Z12.11 COLON CANCER SCREENING: ICD-10-CM

## 2021-05-31 DIAGNOSIS — Z12.31 VISIT FOR SCREENING MAMMOGRAM: ICD-10-CM

## 2021-05-31 PROCEDURE — 1126F AMNT PAIN NOTED NONE PRSNT: CPT | Mod: S$GLB,,, | Performed by: OBSTETRICS & GYNECOLOGY

## 2021-05-31 PROCEDURE — 99396 PR PREVENTIVE VISIT,EST,40-64: ICD-10-PCS | Mod: S$GLB,,, | Performed by: OBSTETRICS & GYNECOLOGY

## 2021-05-31 PROCEDURE — 99999 PR PBB SHADOW E&M-EST. PATIENT-LVL III: ICD-10-PCS | Mod: PBBFAC,,, | Performed by: OBSTETRICS & GYNECOLOGY

## 2021-05-31 PROCEDURE — 3008F BODY MASS INDEX DOCD: CPT | Mod: CPTII,S$GLB,, | Performed by: OBSTETRICS & GYNECOLOGY

## 2021-05-31 PROCEDURE — 99396 PREV VISIT EST AGE 40-64: CPT | Mod: S$GLB,,, | Performed by: OBSTETRICS & GYNECOLOGY

## 2021-05-31 PROCEDURE — 99999 PR PBB SHADOW E&M-EST. PATIENT-LVL III: CPT | Mod: PBBFAC,,, | Performed by: OBSTETRICS & GYNECOLOGY

## 2021-05-31 PROCEDURE — 3008F PR BODY MASS INDEX (BMI) DOCUMENTED: ICD-10-PCS | Mod: CPTII,S$GLB,, | Performed by: OBSTETRICS & GYNECOLOGY

## 2021-05-31 PROCEDURE — 1126F PR PAIN SEVERITY QUANTIFIED, NO PAIN PRESENT: ICD-10-PCS | Mod: S$GLB,,, | Performed by: OBSTETRICS & GYNECOLOGY

## 2021-06-14 ENCOUNTER — HOSPITAL ENCOUNTER (OUTPATIENT)
Dept: RADIOLOGY | Facility: HOSPITAL | Age: 51
Discharge: HOME | End: 2021-06-14
Attending: OBSTETRICS & GYNECOLOGY
Payer: COMMERCIAL

## 2021-06-14 DIAGNOSIS — Z12.31 VISIT FOR SCREENING MAMMOGRAM: ICD-10-CM

## 2021-06-14 PROCEDURE — 77067 SCR MAMMO BI INCL CAD: CPT | Mod: 26,,, | Performed by: RADIOLOGY

## 2021-06-14 PROCEDURE — 77063 BREAST TOMOSYNTHESIS BI: CPT | Mod: 26,,, | Performed by: RADIOLOGY

## 2021-06-14 PROCEDURE — 77067 SCR MAMMO BI INCL CAD: CPT | Mod: TC

## 2021-06-14 PROCEDURE — 77063 MAMMO DIGITAL SCREENING BILAT WITH TOMO: ICD-10-PCS | Mod: 26,,, | Performed by: RADIOLOGY

## 2021-06-14 PROCEDURE — 77067 MAMMO DIGITAL SCREENING BILAT WITH TOMO: ICD-10-PCS | Mod: 26,,, | Performed by: RADIOLOGY

## 2021-09-29 ENCOUNTER — OCCUPATIONAL HEALTH (OUTPATIENT)
Dept: URGENT CARE | Facility: CLINIC | Age: 51
End: 2021-09-29

## 2021-09-29 DIAGNOSIS — Z02.1 PRE-EMPLOYMENT EXAMINATION: Primary | ICD-10-CM

## 2021-09-29 DIAGNOSIS — Z02.89 ENCOUNTER FOR PHYSICAL EXAMINATION RELATED TO EMPLOYMENT: ICD-10-CM

## 2021-09-29 PROCEDURE — 80305 DRUG TEST PRSMV DIR OPT OBS: CPT | Mod: S$GLB,,, | Performed by: NURSE PRACTITIONER

## 2021-09-29 PROCEDURE — 99080 SPECIAL REPORTS OR FORMS: CPT | Mod: S$GLB,,, | Performed by: NURSE PRACTITIONER

## 2021-09-29 PROCEDURE — 97750 PHYSICAL PERFORMANCE TEST: CPT | Mod: S$GLB,,, | Performed by: NURSE PRACTITIONER

## 2021-09-29 PROCEDURE — 99499 PHYSICAL, BASIC COMPLEXITY: ICD-10-PCS | Mod: S$GLB,,, | Performed by: NURSE PRACTITIONER

## 2021-09-29 PROCEDURE — 92552 PURE TONE AUDIOMETRY AIR: CPT | Mod: S$GLB,,, | Performed by: NURSE PRACTITIONER

## 2021-09-29 PROCEDURE — 99499 UNLISTED E&M SERVICE: CPT | Mod: S$GLB,,, | Performed by: NURSE PRACTITIONER

## 2021-09-29 PROCEDURE — 92552 AUDIOGRAM OCC MED: ICD-10-PCS | Mod: S$GLB,,, | Performed by: NURSE PRACTITIONER

## 2021-09-29 PROCEDURE — 80305 OOH NON-DOT DRUG SCREEN: ICD-10-PCS | Mod: S$GLB,,, | Performed by: NURSE PRACTITIONER

## 2021-09-29 PROCEDURE — 99080 OSHA QUESTIONNAIRE: ICD-10-PCS | Mod: S$GLB,,, | Performed by: NURSE PRACTITIONER

## 2021-09-29 PROCEDURE — 94010 BREATHING CAPACITY TEST: CPT | Mod: S$GLB,,, | Performed by: NURSE PRACTITIONER

## 2021-09-29 PROCEDURE — 97750 LIFT TEST: ICD-10-PCS | Mod: S$GLB,,, | Performed by: NURSE PRACTITIONER

## 2021-09-29 PROCEDURE — 94010 PULMONARY FUNCTION SCREENING (OCC MED PHYSICALS): ICD-10-PCS | Mod: S$GLB,,, | Performed by: NURSE PRACTITIONER

## 2021-09-29 PROCEDURE — 97750 PR AGILITY TEST: ICD-10-PCS | Mod: S$GLB,,, | Performed by: NURSE PRACTITIONER

## 2022-06-06 ENCOUNTER — OFFICE VISIT (OUTPATIENT)
Dept: OBSTETRICS AND GYNECOLOGY | Facility: CLINIC | Age: 52
End: 2022-06-06
Payer: COMMERCIAL

## 2022-06-06 VITALS
WEIGHT: 147.69 LBS | DIASTOLIC BLOOD PRESSURE: 64 MMHG | HEIGHT: 60 IN | BODY MASS INDEX: 28.99 KG/M2 | SYSTOLIC BLOOD PRESSURE: 118 MMHG

## 2022-06-06 DIAGNOSIS — Z12.31 VISIT FOR SCREENING MAMMOGRAM: ICD-10-CM

## 2022-06-06 DIAGNOSIS — Z01.419 WELL WOMAN EXAM WITH ROUTINE GYNECOLOGICAL EXAM: Primary | ICD-10-CM

## 2022-06-06 PROCEDURE — 99999 PR PBB SHADOW E&M-EST. PATIENT-LVL III: ICD-10-PCS | Mod: PBBFAC,,, | Performed by: OBSTETRICS & GYNECOLOGY

## 2022-06-06 PROCEDURE — 87624 HPV HI-RISK TYP POOLED RSLT: CPT | Performed by: OBSTETRICS & GYNECOLOGY

## 2022-06-06 PROCEDURE — 3008F PR BODY MASS INDEX (BMI) DOCUMENTED: ICD-10-PCS | Mod: CPTII,S$GLB,, | Performed by: OBSTETRICS & GYNECOLOGY

## 2022-06-06 PROCEDURE — 4010F PR ACE/ARB THEARPY RXD/TAKEN: ICD-10-PCS | Mod: CPTII,S$GLB,, | Performed by: OBSTETRICS & GYNECOLOGY

## 2022-06-06 PROCEDURE — 1160F PR REVIEW ALL MEDS BY PRESCRIBER/CLIN PHARMACIST DOCUMENTED: ICD-10-PCS | Mod: CPTII,S$GLB,, | Performed by: OBSTETRICS & GYNECOLOGY

## 2022-06-06 PROCEDURE — 3078F PR MOST RECENT DIASTOLIC BLOOD PRESSURE < 80 MM HG: ICD-10-PCS | Mod: CPTII,S$GLB,, | Performed by: OBSTETRICS & GYNECOLOGY

## 2022-06-06 PROCEDURE — 99396 PREV VISIT EST AGE 40-64: CPT | Mod: S$GLB,,, | Performed by: OBSTETRICS & GYNECOLOGY

## 2022-06-06 PROCEDURE — 4010F ACE/ARB THERAPY RXD/TAKEN: CPT | Mod: CPTII,S$GLB,, | Performed by: OBSTETRICS & GYNECOLOGY

## 2022-06-06 PROCEDURE — 3078F DIAST BP <80 MM HG: CPT | Mod: CPTII,S$GLB,, | Performed by: OBSTETRICS & GYNECOLOGY

## 2022-06-06 PROCEDURE — 3074F SYST BP LT 130 MM HG: CPT | Mod: CPTII,S$GLB,, | Performed by: OBSTETRICS & GYNECOLOGY

## 2022-06-06 PROCEDURE — 99999 PR PBB SHADOW E&M-EST. PATIENT-LVL III: CPT | Mod: PBBFAC,,, | Performed by: OBSTETRICS & GYNECOLOGY

## 2022-06-06 PROCEDURE — 3008F BODY MASS INDEX DOCD: CPT | Mod: CPTII,S$GLB,, | Performed by: OBSTETRICS & GYNECOLOGY

## 2022-06-06 PROCEDURE — 99396 PR PREVENTIVE VISIT,EST,40-64: ICD-10-PCS | Mod: S$GLB,,, | Performed by: OBSTETRICS & GYNECOLOGY

## 2022-06-06 PROCEDURE — 1159F MED LIST DOCD IN RCRD: CPT | Mod: CPTII,S$GLB,, | Performed by: OBSTETRICS & GYNECOLOGY

## 2022-06-06 PROCEDURE — 3074F PR MOST RECENT SYSTOLIC BLOOD PRESSURE < 130 MM HG: ICD-10-PCS | Mod: CPTII,S$GLB,, | Performed by: OBSTETRICS & GYNECOLOGY

## 2022-06-06 PROCEDURE — 1160F RVW MEDS BY RX/DR IN RCRD: CPT | Mod: CPTII,S$GLB,, | Performed by: OBSTETRICS & GYNECOLOGY

## 2022-06-06 PROCEDURE — 88175 CYTOPATH C/V AUTO FLUID REDO: CPT | Performed by: OBSTETRICS & GYNECOLOGY

## 2022-06-06 PROCEDURE — 1159F PR MEDICATION LIST DOCUMENTED IN MEDICAL RECORD: ICD-10-PCS | Mod: CPTII,S$GLB,, | Performed by: OBSTETRICS & GYNECOLOGY

## 2022-06-06 RX ORDER — IRBESARTAN 300 MG/1
300 TABLET ORAL DAILY
COMMUNITY
Start: 2022-03-15

## 2022-06-06 RX ORDER — CLOTRIMAZOLE AND BETAMETHASONE DIPROPIONATE 10; .64 MG/G; MG/G
CREAM TOPICAL 2 TIMES DAILY
COMMUNITY
Start: 2022-01-05

## 2022-06-06 NOTE — PROGRESS NOTES
Subjective:       Patient ID: Carrie Carpenter is a 51 y.o. female.    Chief Complaint:  Well Woman (Last normal pap with Negative HPV was 04/15/19 and last normal mammogram was 2021)      History of Present Illness  HPI  Annual Exam-Postmenopausal  Patient presents for annual exam. The patient has no GYN complaints today. No hot flashes, vaginal dryness or dyspareunia.  The patient is sexually active. GYN screening history: last pap: approximate date 4/15/2019 and was normal and last mammogram: approximate date nd was normal. The patient is not taking hormone replacement therapy. Patient denies post-menopausal vaginal bleeding. The patient wears seatbelts: yes. The patient participates in regular exercise: no. Has the patient ever been transfused or tattooed?: no/no. The patient reports that there is not domestic violence in her life.    Has had some right lower quadrant abdominal pain, 3-4/10.  No radiation.  Worsens with lying down on the right side and straighten out the right leg.  Better with right knee up.     Previous  section and laparoscopic cholecystectomy  LMP last 2020     GYN & OB History  Patient's last menstrual period was 2020 (exact date).   Date of Last Pap: No result found    OB History    Para Term  AB Living   5 1 1   4 1   SAB IAB Ectopic Multiple Live Births   1 3     1      # Outcome Date GA Lbr Kb/2nd Weight Sex Delivery Anes PTL Lv   5 Term 00 40w0d  2.95 kg (6 lb 8.1 oz) M CS-LTranv Gen N NOAH   4 IAB            3 IAB            2 IAB            1 SAB              History reviewed. No pertinent past medical history.    Past Surgical History:   Procedure Laterality Date     SECTION, LOW TRANSVERSE      CHOLECYSTECTOMY         Family History   Problem Relation Age of Onset    Hypertension Mother     Hyperlipidemia Mother     Diabetes Father     Hypertension Father        Social History     Socioeconomic History     Marital status:    Tobacco Use    Smoking status: Never Smoker    Smokeless tobacco: Never Used   Substance and Sexual Activity    Alcohol use: No    Drug use: No    Sexual activity: Yes     Partners: Male     Birth control/protection: None   Social History Narrative     since 1998    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      hydrOXYzine (ATARAX) 25 MG tablet Take 25 mg by mouth 4 (four) times daily.      irbesartan (AVAPRO) 300 MG tablet Take 300 mg by mouth once daily.       No current facility-administered medications for this visit.       Review of patient's allergies indicates:  No Known Allergies      Review of Systems  Review of Systems   Constitutional: Negative for activity change, appetite change, chills, fatigue, fever and unexpected weight change.   HENT: Negative for mouth sores.    Respiratory: Negative for cough, shortness of breath and wheezing.    Cardiovascular: Negative for chest pain and palpitations.   Gastrointestinal: Positive for abdominal pain. Negative for bloating, blood in stool, constipation, nausea and vomiting.   Endocrine: Negative for diabetes and hot flashes.   Genitourinary: Negative for dysmenorrhea, dyspareunia, dysuria, frequency, hematuria, menorrhagia, menstrual problem, pelvic pain, urgency, vaginal bleeding, vaginal discharge, vaginal pain, urinary incontinence, postcoital bleeding and vaginal odor.   Musculoskeletal: Negative for back pain and myalgias.   Integumentary:  Negative for rash, breast mass and nipple discharge.   Neurological: Negative for seizures and headaches.   Psychiatric/Behavioral: Negative for depression and sleep disturbance. The patient is not nervous/anxious.    Breast: Negative for mass, mastodynia and nipple discharge          Objective:     Physical Exam:   Constitutional: She appears well-developed and well-nourished. No distress.   BMI of 28.9    HENT:   Head: Normocephalic and atraumatic.    Eyes: EOM are normal.      Pulmonary/Chest: Effort normal. No respiratory distress.   Breasts: Non-tender, no engorgement, no masses, no retraction, no discharge. Negative for lymphadenopathy.         Abdominal: Soft. She exhibits no distension. There is no abdominal tenderness. There is no rebound and no guarding.   Pfannenstiel scar  No obvious point tenderness     Genitourinary:    Vagina and uterus normal.   No  no vaginal discharge in the vagina.    Genitourinary Comments: Vulva without any obvious lesions.  Urethral meatus normal size and location without any lesion.  Urethra is non-tender without stricture or discharge.  Bladder is non-tender.  Vaginal vault with good support.  Minimal white discharge noted.  No obvious lesion.  Normal rugation.  Cervix is without any cervical motion tenderness.  No obvious lesion.  Uterus is small, non-tender, normal contour.  Adnexa is without any masses or tenderness.  Perineum without obvious lesion.               Musculoskeletal: Normal range of motion.       Neurological: She is alert.    Skin: Skin is warm and dry.    Psychiatric: She has a normal mood and affect.          Assessment:        1. Well woman exam with routine gynecological exam    2. Visit for screening mammogram    3.  Menopause          Plan:          I have discussed with the patient her condition.  Monthly breast examination was instructed, discussed, and encouraged.  Patient was encouraged to consume a low-calorie, low fat diet, and to increase of physical activity.  Healthy habits encouraged.  A Pap smear was performed with HR-HPV according to the USPSTF recommendations.  Mammogram was ordered because of the combination of her age and risk factors, according to ACOG guidelines.  Gonorrhea and Chlamydia testing not performed;  HIV test not  offered, again according to guidelines.  Colonoscopy discussed according to ACS guideline.  We also discussed her obstetric history and CV risks.   Patient is to continue her medications as prescribed.   She will come back to see me in one year for her annual visit.  She can come back to see me sooner as necessary.  All of her questions were answered appropriately to her satisfaction.

## 2022-06-20 ENCOUNTER — HOSPITAL ENCOUNTER (OUTPATIENT)
Dept: RADIOLOGY | Facility: HOSPITAL | Age: 52
Discharge: HOME | End: 2022-06-20
Attending: OBSTETRICS & GYNECOLOGY
Payer: COMMERCIAL

## 2022-06-20 DIAGNOSIS — Z12.31 VISIT FOR SCREENING MAMMOGRAM: ICD-10-CM

## 2022-06-20 PROCEDURE — 77063 BREAST TOMOSYNTHESIS BI: CPT | Mod: 26,,, | Performed by: RADIOLOGY

## 2022-06-20 PROCEDURE — 77067 SCR MAMMO BI INCL CAD: CPT | Mod: TC

## 2022-06-20 PROCEDURE — 77067 MAMMO DIGITAL SCREENING BILAT WITH TOMO: ICD-10-PCS | Mod: 26,,, | Performed by: RADIOLOGY

## 2022-06-20 PROCEDURE — 77063 MAMMO DIGITAL SCREENING BILAT WITH TOMO: ICD-10-PCS | Mod: 26,,, | Performed by: RADIOLOGY

## 2022-06-20 PROCEDURE — 77063 BREAST TOMOSYNTHESIS BI: CPT | Mod: TC

## 2022-06-20 PROCEDURE — 77067 SCR MAMMO BI INCL CAD: CPT | Mod: 26,,, | Performed by: RADIOLOGY

## 2023-06-12 ENCOUNTER — OFFICE VISIT (OUTPATIENT)
Dept: OBSTETRICS AND GYNECOLOGY | Facility: CLINIC | Age: 53
End: 2023-06-12
Payer: COMMERCIAL

## 2023-06-12 VITALS
DIASTOLIC BLOOD PRESSURE: 64 MMHG | WEIGHT: 154.31 LBS | BODY MASS INDEX: 30.29 KG/M2 | SYSTOLIC BLOOD PRESSURE: 120 MMHG | HEIGHT: 60 IN

## 2023-06-12 DIAGNOSIS — Z12.31 VISIT FOR SCREENING MAMMOGRAM: ICD-10-CM

## 2023-06-12 DIAGNOSIS — Z01.419 WELL WOMAN EXAM WITH ROUTINE GYNECOLOGICAL EXAM: Primary | ICD-10-CM

## 2023-06-12 PROCEDURE — 4010F ACE/ARB THERAPY RXD/TAKEN: CPT | Mod: CPTII,S$GLB,, | Performed by: OBSTETRICS & GYNECOLOGY

## 2023-06-12 PROCEDURE — 99396 PR PREVENTIVE VISIT,EST,40-64: ICD-10-PCS | Mod: S$GLB,,, | Performed by: OBSTETRICS & GYNECOLOGY

## 2023-06-12 PROCEDURE — 3078F PR MOST RECENT DIASTOLIC BLOOD PRESSURE < 80 MM HG: ICD-10-PCS | Mod: CPTII,S$GLB,, | Performed by: OBSTETRICS & GYNECOLOGY

## 2023-06-12 PROCEDURE — 1159F MED LIST DOCD IN RCRD: CPT | Mod: CPTII,S$GLB,, | Performed by: OBSTETRICS & GYNECOLOGY

## 2023-06-12 PROCEDURE — 3074F SYST BP LT 130 MM HG: CPT | Mod: CPTII,S$GLB,, | Performed by: OBSTETRICS & GYNECOLOGY

## 2023-06-12 PROCEDURE — 99396 PREV VISIT EST AGE 40-64: CPT | Mod: S$GLB,,, | Performed by: OBSTETRICS & GYNECOLOGY

## 2023-06-12 PROCEDURE — 3008F BODY MASS INDEX DOCD: CPT | Mod: CPTII,S$GLB,, | Performed by: OBSTETRICS & GYNECOLOGY

## 2023-06-12 PROCEDURE — 99999 PR PBB SHADOW E&M-EST. PATIENT-LVL III: ICD-10-PCS | Mod: PBBFAC,,, | Performed by: OBSTETRICS & GYNECOLOGY

## 2023-06-12 PROCEDURE — 4010F PR ACE/ARB THEARPY RXD/TAKEN: ICD-10-PCS | Mod: CPTII,S$GLB,, | Performed by: OBSTETRICS & GYNECOLOGY

## 2023-06-12 PROCEDURE — 99999 PR PBB SHADOW E&M-EST. PATIENT-LVL III: CPT | Mod: PBBFAC,,, | Performed by: OBSTETRICS & GYNECOLOGY

## 2023-06-12 PROCEDURE — 1160F PR REVIEW ALL MEDS BY PRESCRIBER/CLIN PHARMACIST DOCUMENTED: ICD-10-PCS | Mod: CPTII,S$GLB,, | Performed by: OBSTETRICS & GYNECOLOGY

## 2023-06-12 PROCEDURE — 3078F DIAST BP <80 MM HG: CPT | Mod: CPTII,S$GLB,, | Performed by: OBSTETRICS & GYNECOLOGY

## 2023-06-12 PROCEDURE — 3074F PR MOST RECENT SYSTOLIC BLOOD PRESSURE < 130 MM HG: ICD-10-PCS | Mod: CPTII,S$GLB,, | Performed by: OBSTETRICS & GYNECOLOGY

## 2023-06-12 PROCEDURE — 1160F RVW MEDS BY RX/DR IN RCRD: CPT | Mod: CPTII,S$GLB,, | Performed by: OBSTETRICS & GYNECOLOGY

## 2023-06-12 PROCEDURE — 3008F PR BODY MASS INDEX (BMI) DOCUMENTED: ICD-10-PCS | Mod: CPTII,S$GLB,, | Performed by: OBSTETRICS & GYNECOLOGY

## 2023-06-12 PROCEDURE — 1159F PR MEDICATION LIST DOCUMENTED IN MEDICAL RECORD: ICD-10-PCS | Mod: CPTII,S$GLB,, | Performed by: OBSTETRICS & GYNECOLOGY

## 2023-06-12 RX ORDER — TERBINAFINE HYDROCHLORIDE 250 MG/1
250 TABLET ORAL
COMMUNITY
Start: 2023-02-03

## 2023-06-12 RX ORDER — DICLOFENAC SODIUM 75 MG/1
75 TABLET, DELAYED RELEASE ORAL 2 TIMES DAILY PRN
COMMUNITY
Start: 2023-02-03

## 2023-06-12 NOTE — PROGRESS NOTES
Subjective:       Patient ID: Carrie Carpenter is a 52 y.o. female.    Chief Complaint:  Well Woman (Last normal pap with Negative HPV was 2022 and last normal mammogram was 2022)      History of Present Illness  HPI  Annual Exam-Postmenopausal  Patient presents for annual exam. The patient has no GYN complaints today. No hot flashes, vaginal dryness or dyspareunia.  The patient is sexually active. GYN screening history: last pap: approximate date 2022 and was normal and last mammogram: approximate date 2022 and was normal. The patient is not taking hormone replacement therapy. Patient denies post-menopausal vaginal bleeding. The patient wears seatbelts: yes. The patient participates in regular exercise: no. Has the patient ever been transfused or tattooed?: no/no. The patient reports that there is not domestic violence in her life.     Previous  section and laparoscopic cholecystectomy    GYN & OB History  Patient's last menstrual period was 2020 (exact date).   Date of Last Pap: No result found    OB History    Para Term  AB Living   5 1 1   4 1   SAB IAB Ectopic Multiple Live Births   1 3     1      # Outcome Date GA Lbr Kb/2nd Weight Sex Delivery Anes PTL Lv   5 Term 00 40w0d  2.95 kg (6 lb 8.1 oz) M CS-LTranv Gen N NOAH   4 IAB            3 IAB            2 IAB            1 SAB              History reviewed. No pertinent past medical history.    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      diclofenac (VOLTAREN) 75 MG EC tablet Take 75 mg by mouth 2 (two) times daily as needed.      hydrOXYzine (ATARAX) 25 MG tablet Take 25 mg by mouth 4 (four) times daily.      irbesartan (AVAPRO) 300 MG tablet Take 300 mg by mouth once daily.      terbinafine HCL (LAMISIL) 250 mg tablet Take 250 mg by mouth.       No current facility-administered medications for this visit.       Review of patient's allergies indicates:  No Known Allergies      Review of Systems  Review of Systems   Constitutional:  Negative for activity change, appetite change, chills, fatigue, fever and unexpected weight change.   HENT:  Negative for mouth sores.    Respiratory:  Negative for cough, shortness of breath and wheezing.    Cardiovascular:  Negative for chest pain and palpitations.   Gastrointestinal:  Negative for abdominal pain, bloating, blood in stool, constipation, nausea and vomiting.   Endocrine: Negative for diabetes and hot flashes.   Genitourinary:  Negative for dysmenorrhea, dyspareunia, dysuria, frequency, hematuria, menorrhagia, menstrual problem, pelvic pain, urgency, vaginal bleeding, vaginal discharge, vaginal pain, urinary incontinence, postcoital bleeding and vaginal odor.   Musculoskeletal:  Negative for back pain and myalgias.   Integumentary:  Negative for rash, breast mass and nipple discharge.   Neurological:  Negative for seizures and headaches.   Psychiatric/Behavioral:  Negative for depression and sleep disturbance. The patient is not nervous/anxious.    Breast: Negative for mass, mastodynia and nipple discharge        Objective:    Physical Exam:   Constitutional: She appears well-developed and  well-nourished. No distress.   BMI of 30.14    HENT:   Head: Normocephalic and atraumatic.    Eyes: EOM are normal.      Pulmonary/Chest: Effort normal. No respiratory distress.   Breasts: Non-tender, no engorgement, no masses, no retraction, no discharge. Negative for lymphadenopathy.         Abdominal: Soft. She exhibits no distension. There is no abdominal tenderness. There is no rebound and no guarding.   Pfannenstiel scar  No obvious point tenderness     Genitourinary:    Vagina and uterus normal.   No  no vaginal discharge in the vagina.    Genitourinary Comments: Vulva without any obvious lesions.  Urethral meatus normal size and location without any lesion.  Urethra is non-tender without stricture or discharge.  Bladder is non-tender.  Vaginal vault with good support.  Minimal white discharge noted.  No obvious lesion.  Normal rugation.  Cervix is without any cervical motion tenderness.  No obvious lesion.  Uterus is small, non-tender, normal contour.  Adnexa is without any masses or tenderness.  Perineum without obvious lesion.               Musculoskeletal: Normal range of motion.       Neurological: She is alert.    Skin: Skin is warm and dry.    Psychiatric: She has a normal mood and affect.        Assessment:        1. Well woman exam with routine gynecological exam    2. Visit for screening mammogram    3.  Menopause          Plan:          I have discussed with the patient her condition.  Monthly breast examination was instructed, discussed, and encouraged.  Patient was encouraged to consume a low-calorie, low fat diet, and to increase of physical activity.  Healthy habits encouraged.  A Pap smear was not performed according to the USPSTF recommendations.  Mammogram was ordered because of the combination of her age and risk factors, according to ACOG guidelines.  Gonorrhea and Chlamydia testing not performed;  HIV test not offered, again according to guidelines.  Colonoscopy discussed according to ACS  guideline.  We also discussed her obstetric history and CV risks.   Care Gaps addressed.  She will get her shingle vaccine soon  Patient is to continue her medications as prescribed.   She will come back to see me in one year for her annual visit.  She can come back to see me sooner as necessary.  All of her questions were answered appropriately to her satisfaction.

## 2023-08-01 ENCOUNTER — HOSPITAL ENCOUNTER (OUTPATIENT)
Dept: RADIOLOGY | Facility: HOSPITAL | Age: 53
Discharge: HOME | End: 2023-08-01
Attending: OBSTETRICS & GYNECOLOGY
Payer: COMMERCIAL

## 2023-08-01 VITALS — WEIGHT: 154.31 LBS | BODY MASS INDEX: 30.29 KG/M2 | HEIGHT: 60 IN

## 2023-08-01 DIAGNOSIS — Z12.31 VISIT FOR SCREENING MAMMOGRAM: ICD-10-CM

## 2023-08-01 PROCEDURE — 77063 MAMMO DIGITAL SCREENING BILAT WITH TOMO: ICD-10-PCS | Mod: 26,,, | Performed by: RADIOLOGY

## 2023-08-01 PROCEDURE — 77067 SCR MAMMO BI INCL CAD: CPT | Mod: 26,,, | Performed by: RADIOLOGY

## 2023-08-01 PROCEDURE — 77067 MAMMO DIGITAL SCREENING BILAT WITH TOMO: ICD-10-PCS | Mod: 26,,, | Performed by: RADIOLOGY

## 2023-08-01 PROCEDURE — 77063 BREAST TOMOSYNTHESIS BI: CPT | Mod: 26,,, | Performed by: RADIOLOGY

## 2023-08-01 PROCEDURE — 77067 SCR MAMMO BI INCL CAD: CPT | Mod: TC

## 2024-04-09 ENCOUNTER — OFFICE VISIT (OUTPATIENT)
Dept: URGENT CARE | Facility: CLINIC | Age: 54
End: 2024-04-09
Payer: COMMERCIAL

## 2024-04-09 VITALS
RESPIRATION RATE: 18 BRPM | DIASTOLIC BLOOD PRESSURE: 81 MMHG | HEART RATE: 72 BPM | WEIGHT: 156 LBS | HEIGHT: 60 IN | BODY MASS INDEX: 30.63 KG/M2 | SYSTOLIC BLOOD PRESSURE: 129 MMHG | OXYGEN SATURATION: 98 %

## 2024-04-09 DIAGNOSIS — S66.811A STRAIN OF OTHER SPECIFIED MUSCLES, FASCIA AND TENDONS AT WRIST AND HAND LEVEL, RIGHT HAND, INITIAL ENCOUNTER: Primary | ICD-10-CM

## 2024-04-09 DIAGNOSIS — M67.90 TENDINOPATHY: ICD-10-CM

## 2024-04-09 DIAGNOSIS — Z02.6 ENCOUNTER RELATED TO WORKER'S COMPENSATION CLAIM: ICD-10-CM

## 2024-04-09 LAB
BREATH ALCOHOL: 0
CTP QC/QA: YES
POC 10 PANEL DRUG SCREEN: NEGATIVE

## 2024-04-09 PROCEDURE — 82075 ASSAY OF BREATH ETHANOL: CPT | Mod: S$GLB,,, | Performed by: SURGERY

## 2024-04-09 PROCEDURE — 73130 X-RAY EXAM OF HAND: CPT | Mod: RT,S$GLB,, | Performed by: RADIOLOGY

## 2024-04-09 PROCEDURE — 99204 OFFICE O/P NEW MOD 45 MIN: CPT | Mod: S$GLB,,, | Performed by: SURGERY

## 2024-04-09 PROCEDURE — 80305 DRUG TEST PRSMV DIR OPT OBS: CPT | Mod: QW,S$GLB,, | Performed by: SURGERY

## 2024-04-09 RX ORDER — DICLOFENAC SODIUM 10 MG/G
2 GEL TOPICAL 4 TIMES DAILY
Qty: 200 G | Refills: 1 | Status: SHIPPED | OUTPATIENT
Start: 2024-04-09

## 2024-04-09 RX ORDER — NAPROXEN 500 MG/1
500 TABLET ORAL 2 TIMES DAILY WITH MEALS
Qty: 60 TABLET | Refills: 0 | Status: SHIPPED | OUTPATIENT
Start: 2024-04-09 | End: 2024-04-12 | Stop reason: SINTOL

## 2024-04-09 NOTE — PATIENT INSTRUCTIONS
??ng ??a dino này cho ch? Tamazight ??ng c?a b?n, h? ch? c?n dino có tr?ng thái công vi?c trên ?ó    H?n ch? s? d?ng tiara ph?i và cánh tiara (Không n?m ho?c n?m l?p ?i l?p l?i).     Thoa gel voltaren tr?c ti?p lên tiara 3-4 l?n m?t ngày     U?ng Naproxen ngày 2 l?n sáng và t?i    ?eo n?p leann dee ngày ti?p mariana, quintin ?ó b?t ??u ch? ?eo vào ban ?êm b?t ??u t? t?i th? T?    li?u pháp Tamazight ??ng s? ???c lên l?ch quintin khi b?o hi?m ch?p thu?n

## 2024-04-09 NOTE — PROGRESS NOTES
Subjective:      Patient ID: Carrie Carpenter is a 53 y.o. female.    Chief Complaint: Hand Injury    Patient's place of employment - [employer]  Patient's job title -    Date of injury - 4/9/2024  Body part injured including left or right - right hand  Injury Mechanism - repetitive motion  What they were doing when they got hurt - Stated that she is experiencing hand pain from repetitive use of the right hand.  What they did immediately after - She reported to supervisor.  Pain scale right now - 7/10    KW        Musculoskeletal:  Positive for joint pain, abnormal ROM of joint and arthritis.   See MA note above. Mo MUÑOZ note:  Italian  (#[id number]) used to conduct encounter.     Carrie Carpenter is a RHD 53 y.o. presenting for evaluation of right hand pain.  She reports that more than a year ago she began to experience pain in her right 4th and 5th digits that has now increased to her 3rd digit. Those fingers are stiff when she wakes up and she has to massage the fingers to improvement mobility and alleviate some of the pain. She has noticed some swelling at the base of her middle finger. The pain is worse with bending the fingers. She has used advil and tylenol for pain control and hot oil massage to help get the fingers to bend.   At work she assembles parts, she has been doing this for 2.5 years. She pushes poles together with both hands. She  the poles with her whole hand.   She has not had issues like this in her left hand. She reports that she had an xray performed by her PCP in 2021 and it showed that there was no bony issues with her right hand. No nocturnal pain, just constant stiffness throughout the day.   She currently only takes a blood pressure medication at home besides the advil and tylenol.     Objective:     Physical Exam  Vitals and nursing note reviewed.   HENT:      Head: Normocephalic.   Eyes:      Conjunctiva/sclera: Conjunctivae normal.   Pulmonary:      Effort:  Pulmonary effort is normal.   Musculoskeletal:      Right hand: Tenderness present. Decreased range of motion. Decreased strength of thumb/finger opposition. Normal capillary refill. Normal pulse.      Left hand: Normal.      Comments: There is tenderness to palpation of the 5th, 4th, and 3rd digits of the right hand from the MCPs to the distal phalanx on the palmar aspect of the hand, NTTP of the dorsal aspect. There is no swelling or mass appreciated.  strength is decreased on the right compared to left due to inability to form closed fist secondary to pain. Decreased ROM of the 3rd-5th digits with the third digit exhibiting the most pronounced restriction. Tinel's and carpal compression negative. NTTP of the wrist, elbow, and shoulder, FAROM without pain.    Neurological:      General: No focal deficit present.      Mental Status: She is alert and oriented to person, place, and time.      Motor: Motor function is intact.      Coordination: Coordination is intact.   Psychiatric:         Attention and Perception: Attention normal.         Mood and Affect: Mood normal.         Speech: Speech normal.         Behavior: Behavior normal. Behavior is cooperative.         Thought Content: Thought content normal.          Assessment:      1. Strain of other specified muscles, fascia and tendons at wrist and hand level, right hand, initial encounter    2. Encounter related to worker's compensation claim    3. Tendinopathy      Plan:     Radiograph performed to assess for arthritis, no bony abnormalities noted on radiograph, final radiologist determination is pending. I am concerned for an acute exacerbation of chronic tendinopathy given the chronicity of her symptoms but lack of neuropathic signs. Will try conservative management with splinting and NSAIDs (topical and oral) as well as activity reduction with return to work with restrictions in 2 days. She would also benefit from a course of OT to improve mobility and  function, referral placed. Follow-up on Friday for reassessment.     Medications Ordered This Encounter   Medications    diclofenac sodium (VOLTAREN) 1 % Gel     Sig: Apply 2 g topically 4 (four) times daily.     Dispense:  200 g     Refill:  1    naproxen (NAPROSYN) 500 MG tablet     Sig: Take 1 tablet (500 mg total) by mouth 2 (two) times daily with meals.     Dispense:  60 tablet     Refill:  0     Diagnoses and plan discussed with the patient, as well as the expected course and duration of symptoms. Risks and benefits of any medication prescribed during this visit was explained, verbal instructions on use given. Clinic/Emergency department return precautions were given, can return to [facility] before scheduled follow-up appointment if notes worsening/aggravation of symptoms. All questions and concerns were addressed prior to discharge. Plan was developed with active input from the patient and they verbalized understanding of and agreement with the POC.  [facility] was informed of any referrals and relevant orders.  Note was dictated with voice recognition software, please excuse any grammatical errors.    I spent a total of 45 minutes on the day of the visit.  This includes face to face time and non-face to face time preparing to see the patient (e.g. review of medical record), obtaining and/or reviewing separately obtained history, documenting clinical information in the electronic or other health record, independently interpreting results and communicating results to the patient/family/caregiver, or care coordinator.    Patient Instructions: PT to be scheduled once authorized, Begin Physical Therapy, Attention not to aggravate affected area, Use splint as directed   Restrictions: Limited use of right hand and arm (RTW 4/11/24; No repetitive grasping or gripping)  Follow up in about 3 days (around 4/12/2024).

## 2024-04-09 NOTE — LETTER
[facility]  [address] [facility] [place] [facility] [place] [address]  Phone: [phone]  Fax: [phone]  [facility] [employer]: [email or web address]    Pt Name: Carrie Carpenter  Injury Date: 04/09/2024   Employee ID: [id number] Date of First Treatment: 04/09/2024   Company: [employer]      Appointment Time: Arrived: 8:54 AM   Provider: Shira Powers MD Time Out:11:05 AM      Office Treatment:   1. Strain of other specified muscles, fascia and tendons at wrist and hand level, right hand, initial encounter    2. Encounter related to worker's compensation claim    3. Tendinopathy      Medications Ordered This Encounter   Medications    diclofenac sodium (VOLTAREN) 1 % Gel    naproxen (NAPROSYN) 500 MG tablet        Patient Instructions: PT to be scheduled once authorized, Begin Physical Therapy, Attention not to aggravate affected area, Use splint as directed      Restrictions: Limited use of right hand and arm (RTW 4/11/24; No repetitive grasping or gripping)     Return Appointment: 4/12/2024 at 8:30 AM [facility]

## 2024-04-12 ENCOUNTER — OFFICE VISIT (OUTPATIENT)
Dept: URGENT CARE | Facility: CLINIC | Age: 54
End: 2024-04-12
Payer: COMMERCIAL

## 2024-04-12 VITALS
OXYGEN SATURATION: 97 % | SYSTOLIC BLOOD PRESSURE: 124 MMHG | WEIGHT: 156 LBS | TEMPERATURE: 99 F | RESPIRATION RATE: 16 BRPM | BODY MASS INDEX: 30.63 KG/M2 | DIASTOLIC BLOOD PRESSURE: 76 MMHG | HEART RATE: 64 BPM | HEIGHT: 60 IN

## 2024-04-12 DIAGNOSIS — Z02.6 ENCOUNTER RELATED TO WORKER'S COMPENSATION CLAIM: Primary | ICD-10-CM

## 2024-04-12 DIAGNOSIS — M67.90 TENDINOPATHY: ICD-10-CM

## 2024-04-12 DIAGNOSIS — S66.811D STRAIN OF OTHER SPECIFIED MUSCLES, FASCIA AND TENDONS AT WRIST AND HAND LEVEL, RIGHT HAND, SUBSEQUENT ENCOUNTER: ICD-10-CM

## 2024-04-12 PROCEDURE — 99213 OFFICE O/P EST LOW 20 MIN: CPT | Mod: S$GLB,,, | Performed by: NURSE PRACTITIONER

## 2024-04-12 NOTE — PROGRESS NOTES
Subjective:      Patient ID: Carrie Carpenter is a 53 y.o. female.    Chief Complaint: Hand Injury (right)    Patient's place of employment - [employer]  Patient's job title - Assembly  Date of Injury - 04/09/24  Body part injured - right hand  Current work status per last visit - Light  Improved, same, or worse - worse   Pain Scale right now (1-10) -  6/10    Patient reports she did not tolerate the Naprosyn.  Had nausea vomiting and abdominal pain.  She did take it after a meal.  She has not been able to get the Voltaren prescription filled yet.  [facility] will call her when it is ready.  However, she had some at home that she tried.  Did not have much relief.  Complain of a burning sensation in her hand.  She has taking Advil in the past without any problems.  She wore the splint for 2 days and nights as instructed.  Today she does not have the splint on.  Says she now wears it at nighttime only.  She has requested at work to move to a different department.  Physical therapy is pending approval.MWT    Hand Injury       Gastrointestinal:  Positive for abdominal pain, nausea and vomiting.   Musculoskeletal:  Positive for pain, joint pain, abnormal ROM of joint and arthritis. Negative for joint swelling, muscle cramps and muscle ache.   Skin:  Negative for erythema and bruising.   Neurological:  Negative for tingling and seizures.     Objective:     Physical Exam  Constitutional:       General: She is not in acute distress.     Appearance: Normal appearance.   HENT:      Right Ear: External ear normal.      Left Ear: External ear normal.   Cardiovascular:      Pulses: Normal pulses.   Pulmonary:      Effort: Pulmonary effort is normal.   Abdominal:      General: Abdomen is flat.   Musculoskeletal:      Right hand: Tenderness present. No swelling. Decreased range of motion. Decreased strength. Normal capillary refill. Normal pulse.      Comments: Most of the tenderness is located to the 3rd digit at the MCP joint.  Less  tenderness to the 4th and 5th digits.  No tenderness to palpation to the dorsal aspect.  Unable to flex 3rd 4th and 5th fingers.  Able to extend all fingers. NV intact distally.   Skin:     General: Skin is warm and dry.      Capillary Refill: Capillary refill takes less than 2 seconds.      Findings: No bruising or erythema.   Neurological:      General: No focal deficit present.      Mental Status: She is alert and oriented to person, place, and time.   Psychiatric:         Mood and Affect: Mood normal.         Behavior: Behavior normal.         Thought Content: Thought content normal.         Judgment: Judgment normal.        Assessment:      1. Encounter related to worker's compensation claim    2. Strain of other specified muscles, fascia and tendons at wrist and hand level, right hand, subsequent encounter    3. Tendinopathy      Plan:   I have told patient to discontinue the Naprosyn due to the nausea, vomiting and abdominal pain that she has been having.  She says she tolerates Advil and requests to take over-the-counter Advil.  Told her to take after a good meal if tolerated.  Otherwise, may take over-the-counter Tylenol per label directions.  I have emailed the [facility] regarding the pending therapy referral and the diclofenac gel order.       Patient Instructions: Attention not to aggravate affected area, Daily home exercises/warm soaks, Use splint as directed, PT to be scheduled once authorized   Restrictions: Limited use of right hand and arm (No repetitive grasping or gripping with R hand.)  Follow up in about 1 week (around 4/19/2024).    I spent a total of 25 minutes on the day of the visit.This includes face to face time and non-face to face time preparing to see the patient (eg, review of tests), obtaining and/or reviewing separately obtained history, documenting clinical information in the electronic or other health record, independently interpreting results and communicating results to the  patient/family/caregiver, or care coordinator.

## 2024-04-12 NOTE — LETTER
[facility] Occupational [facility]  [address] [address]  Phone: [phone]  Fax: [phone]  [facility]: [id number]    Pt Name: Carrie Carpenter  Injury Date: 04/09/2024   Employee ID: [id number] Date of Treatment: 04/12/2024   Company: [employer]      Appointment Time: 08:30 AM Arrived: 8:25 AM    Provider: Carin Blanco NP Time Out:9:30 AM      Office Treatment:   1. Encounter related to worker's compensation claim    2. Strain of other specified muscles, fascia and tendons at wrist and hand level, right hand, subsequent encounter    3. Tendinopathy          Patient Instructions: Attention not to aggravate affected area, Daily home exercises/warm soaks, Use splint as directed, PT to be scheduled once authorized      Restrictions: Limited use of right hand and arm (No repetitive grasping or gripping with R hand.)     Return Appointment: 4/19/2024 at 8:45 AM AYSHA

## 2024-04-19 ENCOUNTER — OFFICE VISIT (OUTPATIENT)
Dept: URGENT CARE | Facility: CLINIC | Age: 54
End: 2024-04-19
Payer: COMMERCIAL

## 2024-04-19 VITALS
HEIGHT: 60 IN | OXYGEN SATURATION: 97 % | RESPIRATION RATE: 17 BRPM | WEIGHT: 156 LBS | HEART RATE: 88 BPM | SYSTOLIC BLOOD PRESSURE: 123 MMHG | DIASTOLIC BLOOD PRESSURE: 84 MMHG | BODY MASS INDEX: 30.63 KG/M2

## 2024-04-19 DIAGNOSIS — Z02.6 ENCOUNTER RELATED TO WORKER'S COMPENSATION CLAIM: Primary | ICD-10-CM

## 2024-04-19 DIAGNOSIS — M67.90 TENDINOPATHY: ICD-10-CM

## 2024-04-19 DIAGNOSIS — S66.811D STRAIN OF OTHER SPECIFIED MUSCLES, FASCIA AND TENDONS AT WRIST AND HAND LEVEL, RIGHT HAND, SUBSEQUENT ENCOUNTER: ICD-10-CM

## 2024-04-19 PROCEDURE — 99213 OFFICE O/P EST LOW 20 MIN: CPT | Mod: S$GLB,,, | Performed by: NURSE PRACTITIONER

## 2024-04-19 NOTE — PROGRESS NOTES
Subjective:      Patient ID: Carrie Carpenter is a 53 y.o. female.    Chief Complaint: Hand Pain    Patient's place of employment - [employer]  Patient's job title - Assembly  Date of Injury - 04/09/24  Body part injured - right hand  Current work status per last visit - Light Duty   Improved, same, or worse - Slightly improving   Pain Scale right now (1-10) -  5/10  SB.      using: Chyna #[id number]  Pt states taht she is still having discomfort when bending hand/fingers.   Pt also mentions that when trying to grasp objects using the LT hand it feels stiff. SB    Pt reports her 4th and 5th fingers are feeling better and their mobility has improved.  She continues to have pain and stiffness to the 3rd finger. She has been applying Voltaren gel.  Has not been taking any over-the-counter ibuprofen since the pain level has decreased.  She has been working light duty.  Once again, reviewed the mechanism of injury.  She reports that she repeatedly has been inserting rods into large pipes at work. Says the large pipes are harder to assemble than the smaller ones and she  a lot of the larger pipes. Says she has been having to work with large rods a lot which increases the pain and stiffness. Says she has been having worsening pain and stiffness of 3rd, 4th and 5th fingers since she has been repeatedly grasping the large pipes and inserting the rods which she has been doing for the past 2.5 years. Symptoms have been gradually worsening in the past year.   Currently she is working light duty (since last visit).  Since working light duty,  the pain and stiffness has decreased. MWT        Musculoskeletal:  Positive for joint pain, joint swelling and abnormal ROM of joint. Negative for muscle cramps and muscle ache.   Skin:  Negative for color change, wound, erythema and bruising.   Neurological:  Negative for numbness and tingling.     Objective:     Physical Exam  Constitutional:       General: She is not in acute  distress.     Appearance: Normal appearance. She is normal weight.   HENT:      Right Ear: External ear normal.      Left Ear: External ear normal.   Eyes:      Conjunctiva/sclera: Conjunctivae normal.   Cardiovascular:      Pulses: Normal pulses.   Pulmonary:      Effort: Pulmonary effort is normal.   Abdominal:      General: Abdomen is flat.   Musculoskeletal:         General: Tenderness present. No swelling or deformity.      Right hand: Tenderness present. No swelling or deformity. Decreased range of motion. Decreased strength. Normal capillary refill. Normal pulse.        Hands:       Comments: Tender to palpation to palmar aspect of right hand at MCP joint.  Decreased range of motion to 3rd 4th and 5th fingers.  However, range-of-motion (flexion) of 4th and 5th fingers has improved since last week. Still has very limited AROM (flexion) to 3rd (middle) finger.  Full range of motion of wrist, elbow and shoulder without pain.  Negative Tinel's.  Neurovascular intact distally.   Skin:     General: Skin is warm and dry.      Capillary Refill: Capillary refill takes less than 2 seconds.      Findings: No bruising or erythema.   Neurological:      General: No focal deficit present.      Mental Status: She is alert and oriented to person, place, and time.   Psychiatric:         Mood and Affect: Mood normal.         Behavior: Behavior normal.         Thought Content: Thought content normal.         Judgment: Judgment normal.        Assessment:      1. Encounter related to worker's compensation claim    2. Strain of other specified muscles, fascia and tendons at wrist and hand level, right hand, subsequent encounter    3. Tendinopathy      Plan:    was used throughout this encounter. Chyna #[id number].    I spoke with Ramiro in the [facility] regarding physical therapy.  Therapy is still waiting authorization.  Instructed patient to take over-the-counter Advil 2 tablets 3 times a day with meals for the next 4 - 5 days to  help decrease inflammation.  She will also continue to do warm soaks and gentle range-of-motion exercises.  She has been using a soft rubber ball to try to increase flexion of her fingers.       Patient Instructions: Attention not to aggravate affected area, PT to be scheduled once authorized   Restrictions: Limited use of right hand and arm (No repetitive grasping or gripping with right hand.)  Follow up in about 1 week (around 4/26/2024).    I spent a total of 30 minutes on the day of the visit.This includes face to face time and non-face to face time preparing to see the patient (eg, review of tests), obtaining and/or reviewing separately obtained history, documenting clinical information in the electronic or other health record, independently interpreting results and communicating results to the patient/family/caregiver, or care coordinator.

## 2024-04-19 NOTE — LETTER
[facility]  [address] [address]  Phone: [phone]  Fax: [phone]  [facility]: [id number]    Pt Name: Carrie Carpenter  Injury Date: 04/09/2024   Employee ID: [id number] Date of  Treatment: 04/19/2024   Company: [employer]      Appointment Time: 08:45 AM Arrived: 8:30 AM   Provider: Carin Blanco NP Time Out: 9:35 AM     Office Treatment:   1. Encounter related to worker's compensation claim    2. Strain of other specified muscles, fascia and tendons at wrist and hand level, right hand, subsequent encounter    3. Tendinopathy          Patient Instructions: Attention not to aggravate affected area, PT to be scheduled once authorized      Restrictions: Limited use of right hand and arm (No repetitive grasping or gripping with right hand.)     Return Appointment: 4/25/2024 at 8:45 AM.  CHERYL.

## 2024-04-25 ENCOUNTER — OFFICE VISIT (OUTPATIENT)
Dept: URGENT CARE | Facility: CLINIC | Age: 54
End: 2024-04-25
Payer: COMMERCIAL

## 2024-04-25 VITALS
DIASTOLIC BLOOD PRESSURE: 76 MMHG | RESPIRATION RATE: 18 BRPM | OXYGEN SATURATION: 99 % | HEART RATE: 89 BPM | BODY MASS INDEX: 30.63 KG/M2 | SYSTOLIC BLOOD PRESSURE: 117 MMHG | WEIGHT: 156 LBS | HEIGHT: 60 IN

## 2024-04-25 DIAGNOSIS — M65.331 TRIGGER MIDDLE FINGER OF RIGHT HAND: ICD-10-CM

## 2024-04-25 DIAGNOSIS — M67.90 TENDINOPATHY: ICD-10-CM

## 2024-04-25 DIAGNOSIS — Z02.6 ENCOUNTER RELATED TO WORKER'S COMPENSATION CLAIM: ICD-10-CM

## 2024-04-25 DIAGNOSIS — S66.811D STRAIN OF OTHER SPECIFIED MUSCLES, FASCIA AND TENDONS AT WRIST AND HAND LEVEL, RIGHT HAND, SUBSEQUENT ENCOUNTER: Primary | ICD-10-CM

## 2024-04-25 PROCEDURE — 99214 OFFICE O/P EST MOD 30 MIN: CPT | Mod: S$GLB,,,

## 2024-04-25 NOTE — LETTER
[email or web address]  [address] [address]  Phone: [phone]  Fax: [phone]  [facility]: [id number]    Pt Name: Carrie Carpenter  Injury Date: 04/09/2024   Employee ID:  [id number] Date of First Treatment: 04/25/2024   Company: [employer]      Appointment Time: 08:30 AM Arrived: 8:30 AM   Provider: Darinel Mattson, DO Time Out:10:00 AM     Office Treatment:   1. Strain of other specified muscles, fascia and tendons at wrist and hand level, right hand, subsequent encounter    2. Encounter related to worker's compensation claim    3. Tendinopathy    4. Trigger middle finger of right hand          Patient Instructions: Begin Physical Therapy    Restrictions: Regular Duty     Return Appointment: 5/17/2024 at 12:45 PM

## 2024-04-25 NOTE — PROGRESS NOTES
Subjective:      Patient ID: Carrie Carpenter is a 53 y.o. female.    Chief Complaint: Hand Pain    See MA note. Carrie returns today regarding her right hand injury.  Physical therapy was recently approved and she has her 1st session scheduled for next week.  Overall she feels her right hand symptoms are improving.  She no longer experiences symptoms to her right ring and pinky fingers.  However she continues to have some symptoms to her right middle finger.  This includes swelling and difficulty fully flexing at the MCP PIP and D IP joints.  Currently she has not experiencing any pain, but symptoms will develop with prolonged use.    Patient's place of employment - [employer]  Patient's job title - Assembly  Date of Injury - 04/09/24  Body part injured - right hand  Current work status per last visit - Light Duty   Improved, same, or worse - Improving  Pain Scale right now (1-10) -  3/10  SB.        Musculoskeletal:  Positive for joint pain, joint swelling and abnormal ROM of joint. Negative for muscle cramps and muscle ache.   Skin:  Negative for color change, wound, erythema and bruising.   Neurological:  Negative for numbness and tingling.     Objective:     Physical Exam  Vitals and nursing note reviewed.   Constitutional:       General: She is not in acute distress.     Appearance: Normal appearance.   HENT:      Head: Normocephalic.   Eyes:      General: Lids are normal.      Conjunctiva/sclera: Conjunctivae normal.   Musculoskeletal:      Right wrist: Normal. No swelling or deformity.      Right hand: Swelling present. No deformity, tenderness or bony tenderness. Decreased range of motion. Normal pulse.      Cervical back: No pain with movement.      Comments: Very mild swelling noted to the right middle finger.  No skin break down and no bruising noted.  She has have decreased range of motion of the right middle finger.  Normal motion to the other fingers and right thumb.  When attempting to flex at the PIP joint  there is an audible pop sensation.  In 1 instance there was locking at the PIP of her right middle finger.   Skin:     General: Skin is warm.      Capillary Refill: Capillary refill takes less than 2 seconds.      Findings: No abrasion, abscess, bruising, ecchymosis, erythema or wound.   Neurological:      General: No focal deficit present.      Mental Status: She is alert.   Psychiatric:         Attention and Perception: Attention normal.         Mood and Affect: Mood and affect normal.         Speech: Speech normal.         Behavior: Behavior normal. Behavior is cooperative.        Assessment:      1. Strain of other specified muscles, fascia and tendons at wrist and hand level, right hand, subsequent encounter    2. Encounter related to worker's compensation claim    3. Tendinopathy    4. Trigger middle finger of right hand      Plan:   Overall, she feels much improved despite her right middle finger symptoms.  As such, she feels that she can perform her regular duties.  Clearly she has some trigger finger findings on exam with an audible popping sensation when she tries to flex at her right middle finger PIP joint.  I do believe occupational therapy will be helpful in helping with her symptoms so she is aware to keep her appointment next week.  Returned to regular duty status but I will have her follow up in 3 weeks to check on her progress with therapy.    I spent a total of 30 minutes on the day of the visit.This includes face to face time and non-face to face time preparing to see the patient (eg, review of tests), obtaining and/or reviewing separately obtained history, documenting clinical information in the electronic or other health record, independently interpreting results and communicating results to the patient/family/caregiver, or care coordinator.        Patient Instructions: Begin Physical Therapy   Restrictions: Regular Duty  Follow up in about 3 weeks (around 5/16/2024).

## 2024-05-09 ENCOUNTER — CLINICAL SUPPORT (OUTPATIENT)
Dept: REHABILITATION | Facility: HOSPITAL | Age: 54
End: 2024-05-09
Attending: SURGERY
Payer: COMMERCIAL

## 2024-05-09 DIAGNOSIS — Z02.6 ENCOUNTER RELATED TO WORKER'S COMPENSATION CLAIM: Primary | ICD-10-CM

## 2024-05-09 DIAGNOSIS — M67.90 TENDINOPATHY: ICD-10-CM

## 2024-05-09 DIAGNOSIS — S66.811A STRAIN OF OTHER SPECIFIED MUSCLES, FASCIA AND TENDONS AT WRIST AND HAND LEVEL, RIGHT HAND, INITIAL ENCOUNTER: ICD-10-CM

## 2024-05-09 DIAGNOSIS — R29.898 DECREASED GRIP STRENGTH OF RIGHT HAND: ICD-10-CM

## 2024-05-09 DIAGNOSIS — M25.641 DECREASED RANGE OF MOTION OF FINGER OF RIGHT HAND: ICD-10-CM

## 2024-05-09 PROCEDURE — 97165 OT EVAL LOW COMPLEX 30 MIN: CPT | Mod: PN

## 2024-05-09 PROCEDURE — 97530 THERAPEUTIC ACTIVITIES: CPT | Mod: PN

## 2024-05-09 NOTE — PLAN OF CARE
"[facility] Worker's Compensation  Occupational Therapy Initial Evaluation     Name: Carrie Carpenter  Clinic Number: [id number]    Therapy Diagnosis:   Encounter Diagnoses   Name Primary?    Encounter related to worker's compensation claim Yes    Strain of other specified muscles, fascia and tendons at wrist and hand level, right hand, initial encounter     Tendinopathy      Physician: Shira Powers MD    Physician Orders: Eval and treat  Medical Diagnosis from Referral:  Z02.6 (ICD-10-CM) - Encounter related to worker's compensation claim  S66.811A (ICD-10-CM) - Strain of other specified muscles, fascia and tendons at wrist and hand level, right hand, initial encounter  M67.90 (ICD-10-CM) - Tendinopathy  Evaluation Date: 5/9/2024  Authorization Period Expiration: 4/9/25  Plan of Care Expiration: 8/9/24  Progress Note Due: 6/9/24  Visit # / Visits authorized: 1/12    Time In: 16:05  Time Out: 16:45  Total Appointment Time (timed & untimed codes): 40 minutes    Precautions: Standard    Subjective      Involved Side: right   Dominant Side: left    Date of injury: Insidious onset of symptoms since approx. 1 year prior   History of current condition - Butler reports: "I have a problem with my middle, ring, and little fingers. They are very painful. My middle finger also has been getting stuck. I think it's getting a little better now since I've been on light duty. 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     COMPARISON:  None     FINDINGS:  No acute fracture or dislocation seen.     No significant osteophyte formation, osseous erosions, or joint space narrowing.     No significant soft tissue edema or radiopaque retained foreign body.     Impression:     No acute osseous abnormality identified."    Past Medical History/Physical Systems Review:   Carrie Carpenter  has no past medical history on file.    Carrie Carpenter  has a past surgical history that includes  section, low transverse () and Cholecystectomy ().    Carrie has a current medication list which includes the following prescription(s): clotrimazole-betamethasone 1-0.05%, diclofenac, diclofenac sodium, hydroxyzine hcl, irbesartan, and terbinafine hcl.    Review of patient's allergies indicates:  No Known Allergies     Patient's Goals for Therapy: Decrease R hand pain     Pain:  Functional Pain Scale Rating 0-10:   5/10 on average  3/10 at best  7/10 at worst  Location: R hand, mainly dorsal, with passive flexion of digits 3-5 (long finger> ring and small fingers)   Description: Variable  Aggravating Factors: trying to  and move fingers   Easing Factors: rest    Functional Limitations/Social History:  Pt resides with family in a 1 story home    Previous functional status includes: Independent with all ADLs, working full time, full duty    Current Job Specific Functional Deficits:   -Activities that require prolonged gripping and pinching   -Twisting/screwing small pieces     Objective      Joint integrity: MCP joints intact  Skin integrity: skin intact and dry  Edema:  B hands         Circumferential (in cm) L R   Proximal Wrist Crease 18.5 18.2   Long Proximal Phalanx 6 6.5     Sensation:   Light Touch:       Intact  Proprioception:   Intact    Upper Extremity Range of Motion - R hand   AROM  Middle  Ring  Small   MP 0/85 0/80 0/80   PIP 0/80 0/90 0/95 "   DIP  0/50 0/70 0/80   HILL  215 240 255     L MF HILL: 270  LRF HILL:  265  L SF HILL: 270       and Pinch Strength (in pounds, psi's):- TBA next session    Left Right    II     Lateral/key     3 pt     2 pt                             Functional Job Specific Testing:     Job Specific Task Job Demands Current Ability Deficit? 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 Assembling belts  Finger dexterity, /pinch strenght Limited due to pain and decreased middle finger ROM Yes   (Testing should not be performed beyond that of the patient's job demands.)    Treatment     Treatment Time In: 16:35  Treatment Time Out: 16:45  Total Treatment time separate from Evaluation time:10 minutes    Butler received the treatments listed below:     Therapeutic activities to improve functional performance for 10 minutes, including:  -Education on types and benefits of orthosis use for stenosing tenosynovitis  -Education on joint protection and PROM stretching for finger ROM    Patient Education and Home Exercises      Home Exercise Program/Education:  Issued HEP (see patient instructions in EMR) and educated on modality use for pain management . Exercises were reviewed and Butler was able to demonstrate them prior to the end of the session.   Pt received a written copy of exercises to perform at home. Butler demonstrated good  understanding of the education provided.  Pt was advised to perform these exercises free of pain, and to stop performing them if pain occurs.    Patient/Family Education: role of OT, goals for OT, scheduling/cancellations - pt verbalized understanding. Discussed insurance limitations with patient.    Assessment     Carrie Carpenter is a 53 y.o. female referred to outpatient occupational therapy and presents with a medical diagnosis of Z02.6 (ICD-10-CM) - Encounter related to worker's  compensation claim, S66.811A (ICD-10-CM) - Strain of other specified muscles, fascia and tendons at wrist and hand level, right hand, initial encounter, and M67.90 (ICD-10-CM) - Tendinopathy, resulting in Decreased ROM, Decreased  strength, Decreased functional hand use, Increased pain, Edema, and Joint Stiffness and demonstrates limitations as described in the chart below.   The patient's current job specific task deficits include the following: activities that require prolonged gripping and pinching & twisting/screwing small pieces     Following medical record review it is determined that pt will benefit from occupational therapy services in order to maximize pain free and/or functional use of her R hand. The following goals were discussed with the patient and patient is in agreement with them as to be addressed in the treatment plan. The patient's rehab potential is Good.     Anticipated barriers to occupational therapy: Repetitive nature of job     Plan of care discussed with patient: Yes  Patient's spiritual, cultural and educational needs considered and patient is agreeable to the plan of care and goals as stated below:     Medical Necessity is demonstrated by the following  Occupational Profile/History  Co-morbidities and personal factors that may impact the plan of care [x] LOW: Brief chart review  [] MODERATE: Expanded chart review   [] HIGH: Extensive chart review    Moderate / High Support Documentation: N/A     Examination  Performance deficits relating to physical, cognitive or psychosocial skills that result in activity limitations and/or participation restrictions  [] LOW: addressing 1-3 Performance deficits  [] MODERATE: 3-5 Performance deficits  [x] HIGH: 5+ Performance deficits (please support below)    Moderate / High Support Documentation:    Physical:  Joint Mobility  Edema   Strength  Pinch Strength  Fine Motor Coordination  Pain    Cognitive:  No Deficits    Psychosocial:    No  Deficits     Treatment Options [x] LOW: Limited options  [] MODERATE: Several options  [] HIGH: Multiple options      Decision Making/ Complexity Score: low       The following goals were discussed with the patient and patient is in agreement with them as to be addressed in the treatment plan.     ShortTerm Goals (to be met in 3 weeks or by 5/30/24):   1. Patient to be IND and compliant with HEP & attendance for duration of therapy.  2. Patient will demonstrate increased HILL in middle/ring/small fingers by 10 degrees to restore functional hand use for ADLs and work-related activities.   3. Assess  and functional pinches and amend LTG.   4. Patient will report no more than 3/10 pain in R fingers/hand.       Long Term Goals (to be met in 6 weeks or by DC):   1. Patient to be IND and compliant with HEP & attendance for duration of therapy.  2. Patient will demonstrate increased HILL in R middle finger within 5 degrees of her L middle finger HILL to increase functional hand use for ADLs and work-related tasks.   3. Patient will report no more than 1/10 pain in R fingers/hand.     Pt will return to work modified duty, full time.    Plan      Certification Period/Plan of care expiration: 5/9/2024 to 8/9/2024.    Outpatient Occupational Therapy 1 time weekly for 6 weeks to include the following interventions: Fluidotherapy, Manual therapy/joint mobilizations, Modalities for pain management, US 3 mhz, Therapeutic exercises/activities., Orthotic Fabrication/Fit/Training, Edema Control, Joint Protection, and Energy Conservation.    Upon discharge from skilled Occupation Therapy intervention it will be determined if the need for a work conditioning program or Functional Capacity Evaluation is required to allow the injured worker to return to work with full potential achieved.     Sneha Small, OT

## 2024-05-10 PROBLEM — M25.641 DECREASED RANGE OF MOTION OF FINGER OF RIGHT HAND: Status: ACTIVE | Noted: 2024-05-10

## 2024-05-10 PROBLEM — R29.898 DECREASED GRIP STRENGTH OF RIGHT HAND: Status: ACTIVE | Noted: 2024-05-10

## 2024-05-14 NOTE — PROGRESS NOTES
"[facility] Worker's Compensation  Occupational Therapy Treatment Note     Date: 5/16/2024  Name: Carrie Carpenter  Clinic Number: [id number]    Therapy Diagnosis:   Encounter Diagnoses   Name Primary?    Encounter related to worker's compensation claim Yes    Strain of other specified muscles, fascia and tendons at wrist and hand level, right hand, initial encounter     Tendinopathy     Decreased range of motion of finger of right hand     Decreased  strength of right hand      Physician: Shira Powers MD    Physician Orders: Eval and treat  Medical Diagnosis from Referral:  Z02.6 (ICD-10-CM) - Encounter related to worker's compensation claim  S66.811A (ICD-10-CM) - Strain of other specified muscles, fascia and tendons at wrist and hand level, right hand, initial encounter  M67.90 (ICD-10-CM) - Tendinopathy  Evaluation Date: 5/9/2024  Authorization Period Expiration: 4/9/25  Plan of Care Expiration: 8/9/24  Progress Note Due: 6/9/24  Visit # / Visits authorized: 2/12  (# of No Show Appts 0/Number of Cancelled Appts 0)     Time In: 16:05  Time Out: 16:45  Total Billable Time: 40 minutes    Occupation (including job description if provided):  at [employer]  Job Demands: manipulating small pieces of equipment, repetitive fine motor tasks such as screwing pieces of plastic together  Current Work Restrictions: light duty     Subjective     Pt reports: "I had to go back to regular duty this week- no more light duty."     She was compliant with home exercise program given last session.     Response to previous treatment:N/A    Functional change: Decreased finger pain    Pain: 5/10  Location: right long finger only     Objective     Objective Measures updated at progress report unless specified.    Treatment     Butler received the treatments listed below:      Therapeutic activities to improve functional performance for 25 minutes, including:  -Fabrication of custom trigger " finger orthosis for long finger with training on wear schedule and activity modifications  -Review of current HEP    Direct contact modalities after being cleared for contraindications:   -Utrasound for scar tissue remodeling, increased circulation, & tissue elasticity @ 100% duty cycle, 3.3 Mhz, applied to L dorsal palm/finger, intensity = 0.6 w/cm2 for 8 minutes. Patient tolerated treatment well without adverse effects. Therapist was in attendance throughout intervention.    Supervised modalities after being cleared for contradictions:   -Moist heat application to L hand for 7 minutes to facilitate tissue extensibility & ROM prior to therex    Patient Education and Home Exercises     Education provided:   - Importance of compliance w/ HEP to maximize gains   - Progress towards goals     Written Home Exercises Provided: Yes.   Exercises were reviewed and Carrie was able to demonstrate them prior to the end of the session.  Butler demonstrated good  understanding of the HEP provided. See EMR under Patient Instructions for exercises provided during previous and today's therapy sessions.       Assessment     Pt would continue to benefit from skilled OT. 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     The patient's current job specific task deficits include the following:   -Activities that require prolonged gripping and pinching   -Twisting/screwing small pieces     Butler is progressing well towards her goals and there are no updates to goals at this time. Pt prognosis is Good.     Pt will continue to benefit from skilled Outpatient Occupational Therapy to address the deficits listed in the problem list on initial evaluation provide Pt/family education and to maximize Pt's level of independence in the home and community environment.     Pt's spiritual, cultural and educational needs considered  and pt agreeable to plan of care and goals.      Anticipated barriers to occupational therapy: Repetitive nature of job      ShortTerm Goals (to be met in 3 weeks or by 5/30/24):   1. Patient to be IND and compliant with HEP & attendance for duration of therapy.  2. Patient will demonstrate increased HILL in middle/ring/small fingers by 10 degrees to restore functional hand use for ADLs and work-related activities.   3. Assess  and functional pinches and amend LTG.   4. Patient will report no more than 3/10 pain in R fingers/hand.         Long Term Goals (to be met in 6 weeks or by DC):   1. Patient to be IND and compliant with HEP & attendance for duration of therapy.  2. Patient will demonstrate increased HILL in R middle finger within 5 degrees of her L middle finger HILL to increase functional hand use for ADLs and work-related tasks.   3. Patient will report no more than 1/10 pain in R fingers/hand.      Plan     Patient is to be treated by Occupational Therapy 1 time per week for 6 weeks, or 6 visits, during the certification period from 5/9/24 to 8/9/24 to achieve the established goals.       Updates/Grading for next session: TBD pending progress       CHAPARRITA Castro

## 2024-05-16 ENCOUNTER — CLINICAL SUPPORT (OUTPATIENT)
Dept: REHABILITATION | Facility: HOSPITAL | Age: 54
End: 2024-05-16
Payer: COMMERCIAL

## 2024-05-16 DIAGNOSIS — Z02.6 ENCOUNTER RELATED TO WORKER'S COMPENSATION CLAIM: Primary | ICD-10-CM

## 2024-05-16 DIAGNOSIS — R29.898 DECREASED GRIP STRENGTH OF RIGHT HAND: ICD-10-CM

## 2024-05-16 DIAGNOSIS — S66.811A STRAIN OF OTHER SPECIFIED MUSCLES, FASCIA AND TENDONS AT WRIST AND HAND LEVEL, RIGHT HAND, INITIAL ENCOUNTER: ICD-10-CM

## 2024-05-16 DIAGNOSIS — M25.641 DECREASED RANGE OF MOTION OF FINGER OF RIGHT HAND: ICD-10-CM

## 2024-05-16 DIAGNOSIS — M67.90 TENDINOPATHY: ICD-10-CM

## 2024-05-16 PROCEDURE — 97035 APP MDLTY 1+ULTRASOUND EA 15: CPT | Mod: PN

## 2024-05-16 PROCEDURE — 97010 HOT OR COLD PACKS THERAPY: CPT | Mod: PN

## 2024-05-16 PROCEDURE — 97530 THERAPEUTIC ACTIVITIES: CPT | Mod: PN

## 2024-05-16 NOTE — PATIENT INSTRUCTIONS
-Wear orthosis AM and PM if possible for 4-6 weeks  (at work as well), remove every 3 hours for PROM flexion and extension only at this time   -Use moist heat 2 x daily, 10 min each, then follow with massage to palm and middle finger for 5 minutes

## 2024-05-17 ENCOUNTER — OFFICE VISIT (OUTPATIENT)
Dept: URGENT CARE | Facility: CLINIC | Age: 54
End: 2024-05-17
Payer: COMMERCIAL

## 2024-05-17 VITALS
WEIGHT: 156 LBS | RESPIRATION RATE: 14 BRPM | OXYGEN SATURATION: 96 % | HEIGHT: 60 IN | DIASTOLIC BLOOD PRESSURE: 68 MMHG | SYSTOLIC BLOOD PRESSURE: 110 MMHG | BODY MASS INDEX: 30.63 KG/M2 | HEART RATE: 81 BPM

## 2024-05-17 DIAGNOSIS — M65.331 TRIGGER MIDDLE FINGER OF RIGHT HAND: ICD-10-CM

## 2024-05-17 DIAGNOSIS — M67.90 TENDINOPATHY: ICD-10-CM

## 2024-05-17 DIAGNOSIS — S66.811D STRAIN OF OTHER SPECIFIED MUSCLES, FASCIA AND TENDONS AT WRIST AND HAND LEVEL, RIGHT HAND, SUBSEQUENT ENCOUNTER: Primary | ICD-10-CM

## 2024-05-17 PROCEDURE — 99213 OFFICE O/P EST LOW 20 MIN: CPT | Mod: S$GLB,,,

## 2024-05-17 NOTE — PROGRESS NOTES
Subjective:      Patient ID: Carrie Carpenter is a 53 y.o. female.    Chief Complaint: Hand Injury (rt)    See MA note.  She is attended her initial occupational therapy evaluation and 1 additional therapy session since her last evaluation.  She notes some slight reduction in pain to her right middle finger.  She has not experiencing any popping or locking sensation which was present during her last visit.    Patient's place of employment - [employer]  Patient's job title - assembly  Date of Injury - 04/09/2024  Body part injured - rt hand  Current work status per last visit - regular duty  Improved, same, or worse - better  Pain Scale right now (1-10) -  5/10    Hand Injury   Pertinent negatives include no numbness.       Musculoskeletal:  Positive for pain, joint pain and abnormal ROM of joint. Negative for joint swelling.   Neurological:  Negative for numbness and tingling.     Objective:     Physical Exam  Vitals and nursing note reviewed.   Constitutional:       General: She is not in acute distress.     Appearance: Normal appearance.   HENT:      Head: Normocephalic.   Eyes:      General: Lids are normal.      Conjunctiva/sclera: Conjunctivae normal.   Musculoskeletal:      Right wrist: Normal. No swelling or deformity.      Right hand: Swelling present. No deformity, tenderness or bony tenderness. Decreased range of motion. Normal pulse.      Cervical back: No pain with movement.      Comments: Very mild swelling noted to the right middle finger which is slightly improved since last evaluation.  No skin break down and no bruising noted.  She has have decreased range of motion of the right middle finger.  Normal motion to the other fingers and right thumb.  Unlike her last office visit, there is no popping or locking of right middle finger PIP joint.     Skin:  General: Skin is warm.      Capillary Refill: Capillary refill takes less than 2 seconds.      Findings: No abrasion, abscess, bruising, ecchymosis, erythema  or wound.   Neurological:      General: No focal deficit present.      Mental Status: She is alert.   Psychiatric:         Attention and Perception: Attention normal.         Mood and Affect: Mood and affect normal.         Speech: Speech normal.         Behavior: Behavior normal. Behavior is cooperative.     Assessment:      1. Strain of other specified muscles, fascia and tendons at wrist and hand level, right hand, subsequent encounter    2. Tendinopathy    3. Trigger middle finger of right hand      Plan:   Today there is less swelling and no popping or locking of her right middle finger.  However her mobility at her MCP and PIP joints of her right middle finger have only minimally improved since her last evaluation.  I have encouraged her to continue with the occupational therapy and perform her home exercises on a regular basis.  Follow up in approximately 1 month    I spent a total of 20 minutes on the day of the visit.This includes face to face time and non-face to face time preparing to see the patient (eg, review of tests), obtaining and/or reviewing separately obtained history, documenting clinical information in the electronic or other health record, independently interpreting results and communicating results to the patient/family/caregiver, or care coordinator.             Restrictions: Regular Duty  Follow up in about 4 weeks (around 6/14/2024).

## 2024-05-17 NOTE — LETTER
[facility] - Occupational Health  [address] [address]  Phone: [phone]  Fax: [phone]  [facility]: [id number]    Pt Name: Carrie Carpenter  Injury Date: 04/09/2024   Employee ID: [id number] Date of Treatment: 05/17/2024   Company: [employer]      Appointment Time: 12:45 PM Arrived: 12:41 PM    Provider: Darinel Mattson,  Time Out:1:33 PM      Office Treatment:   1. Strain of other specified muscles, fascia and tendons at wrist and hand level, right hand, subsequent encounter    2. Tendinopathy    3. Trigger middle finger of right hand               Restrictions: Regular Duty     Return Appointment: 6/14/2024 at 12:45 PM NATE

## 2024-05-20 ENCOUNTER — CLINICAL SUPPORT (OUTPATIENT)
Dept: REHABILITATION | Facility: HOSPITAL | Age: 54
End: 2024-05-20
Payer: COMMERCIAL

## 2024-05-20 DIAGNOSIS — Z02.6 ENCOUNTER RELATED TO WORKER'S COMPENSATION CLAIM: Primary | ICD-10-CM

## 2024-05-20 DIAGNOSIS — M25.641 DECREASED RANGE OF MOTION OF FINGER OF RIGHT HAND: ICD-10-CM

## 2024-05-20 DIAGNOSIS — R29.898 DECREASED GRIP STRENGTH OF RIGHT HAND: ICD-10-CM

## 2024-05-20 DIAGNOSIS — S66.811A STRAIN OF OTHER SPECIFIED MUSCLES, FASCIA AND TENDONS AT WRIST AND HAND LEVEL, RIGHT HAND, INITIAL ENCOUNTER: ICD-10-CM

## 2024-05-20 PROCEDURE — 97140 MANUAL THERAPY 1/> REGIONS: CPT | Mod: PN

## 2024-05-20 PROCEDURE — 97530 THERAPEUTIC ACTIVITIES: CPT | Mod: PN

## 2024-06-03 ENCOUNTER — CLINICAL SUPPORT (OUTPATIENT)
Dept: REHABILITATION | Facility: HOSPITAL | Age: 54
End: 2024-06-03
Payer: COMMERCIAL

## 2024-06-03 DIAGNOSIS — Z02.6 ENCOUNTER RELATED TO WORKER'S COMPENSATION CLAIM: Primary | ICD-10-CM

## 2024-06-03 DIAGNOSIS — S66.811A STRAIN OF OTHER SPECIFIED MUSCLES, FASCIA AND TENDONS AT WRIST AND HAND LEVEL, RIGHT HAND, INITIAL ENCOUNTER: ICD-10-CM

## 2024-06-03 DIAGNOSIS — R29.898 DECREASED GRIP STRENGTH OF RIGHT HAND: ICD-10-CM

## 2024-06-03 DIAGNOSIS — M25.641 DECREASED RANGE OF MOTION OF FINGER OF RIGHT HAND: ICD-10-CM

## 2024-06-03 PROCEDURE — 97022 WHIRLPOOL THERAPY: CPT | Mod: PN

## 2024-06-03 PROCEDURE — 97110 THERAPEUTIC EXERCISES: CPT | Mod: PN

## 2024-06-03 PROCEDURE — 97530 THERAPEUTIC ACTIVITIES: CPT | Mod: PN

## 2024-06-03 NOTE — PROGRESS NOTES
"[facility] Worker's Compensation  Occupational Therapy Progress & Treatment Note     Date: 6/3/2024  Name: Carrie Carpenter  Clinic Number: [id number]    Therapy Diagnosis:   Encounter Diagnoses   Name Primary?    Encounter related to worker's compensation claim Yes    Strain of other specified muscles, fascia and tendons at wrist and hand level, right hand, initial encounter     Decreased range of motion of finger of right hand     Decreased  strength of right hand      Physician: Shira Powers MD    Physician Orders: Eval and treat  Medical Diagnosis from Referral:  Z02.6 (ICD-10-CM) - Encounter related to worker's compensation claim  S66.811A (ICD-10-CM) - Strain of other specified muscles, fascia and tendons at wrist and hand level, right hand, initial encounter  M67.90 (ICD-10-CM) - Tendinopathy  Evaluation Date: 5/9/2024  Authorization Period Expiration: 4/9/25  Plan of Care Expiration: 8/9/24; 6 visits on POC   Progress Note Due: 6/9/24  Visit # / Visits authorized: 4/12  (# of No Show Appts 0/Number of Cancelled Appts 0)     Time In: 15:20  Time Out: 16:00  Total Billable Time: 40 minutes    Occupation (including job description if provided):  at [employer]  Job Demands: manipulating small pieces of equipment, repetitive fine motor tasks such as screwing pieces of plastic together  Current Work Restrictions: light duty     Subjective     Pt reports: "My finger is a little better than last week. I've been wearing the splint more."     She was compliant with home exercise program given last session.     Response to previous treatment: favorable    Functional change: Decreased long finger pain     Pain: 4/10 with passive flexion due to stiffness   Location: right long finger only     Objective     Objective Measures updated at progress report unless specified.    Upper Extremity Range of Motion - R hand (measured in degrees)   AROM  Middle  Ring  Small   MP 0/90 " 0/85 0/90   PIP 0/100 0/100 0/95   DIP  0/55 0/70 0/80   HILL  245   (+30) 250  (+15) 265  (+10)      L MF HILL: 270  LRF HILL:  265  L SF HILL: 270         and Pinch Strength (in pounds, psi's):    Left Right    II 33  *19.8   Lateral/key 14   10    3 pt  15  10   2 pt  14 12      *Note: Mild long finger pain with  testing only     Treatment     Butler received the treatments listed below:      Re-Assessment performed with measurements and report taken, goals updated x 15 minutes      Supervised modalities & therapeutic exercises after being cleared for contradictions x 20 minutes   -Fluidotherapy to R hand for 10 minutes to increase blood flow, circulation, desensitization, sensory re-education and for pain management. Performed the following therex within fluidotherapy: digit flexion/extension/ABD/ADD  -Lateral/key and 2 pt pinches with red weighted clothespin x 2 min each   -EDC strengthening with Digiextend (red) x 2 min   -Thumb ADD and opposition with Powerweb x 2 min   -Rest breaks as needed     Therapeutic activities to improve functional performance for 10 minutes, including:  -Review of previously provided education on importance of compliance with orthosis wear to reduce incidence of long finger triggering, joint protection and activity modification measures while at work, and HEP    Patient Education and Home Exercises     Education provided:   - Importance of compliance w/ HEP to maximize gains   - Progress towards goals     Written Home Exercises Provided: Yes.   Exercises were reviewed and Butler was able to demonstrate them prior to the end of the session.  Butler demonstrated good  understanding of the HEP provided. See EMR under Patient Instructions for exercises provided during previous and today's therapy sessions.       Assessment     Pt would continue to benefit from skilled OT. Pt is making slow progress towards all goals, noting increased HILL in digits 3-5, decreased overall long finger  pain/stiffness, and decreased incidence of long finger triggering.     The patient's current job specific task deficits include the following:   -Activities that require prolonged gripping and pinching   -Twisting/screwing small pieces    Butler is progressing well towards her goals and there are no updates to goals at this time. Pt prognosis is Good.     Pt will continue to benefit from skilled Outpatient Occupational Therapy to address the deficits listed in the problem list on initial evaluation provide Pt/family education and to maximize Pt's level of independence in the home and community environment.     Pt's spiritual, cultural and educational needs considered and pt agreeable to plan of care and goals.      Anticipated barriers to occupational therapy: Repetitive nature of job      ShortTerm Goals (to be met in 3 weeks or by 5/30/24):   1. Patient to be IND and compliant with HEP & attendance for duration of therapy. In progress 6/3  2. Patient will demonstrate increased HILL in middle/ring/small fingers by 10 degrees to restore functional hand use for ADLs and work-related activities. Goal Met 6/3  3. Assess  and functional pinches and amend LTG. Goal Met 6/3  4. Patient will report no more than 3/10 pain in R fingers/hand. Goal Met 6/3        Updated Long Term Goals (to be met in 6 weeks or by DC):   1. Patient to be IND and compliant with HEP & attendance for duration of therapy.  2. Patient will demonstrate increased HILL in R middle finger within 5 degrees of her L middle finger HILL to increase functional hand use for ADLs and work-related tasks.   3. Patient will report no more than 1/10 pain in R fingers/hand.   4. Patient will demonstrate increased R  strength by 3-5 lbs.   5. Patient will demonstrate increased R pinch strength (3 positions) by 1-2 psi's.      Plan     Patient is to be treated by Occupational Therapy 1 time per week for 6 weeks, or 6 visits, during the certification period from  5/9/24 to 8/9/24 to achieve the established goals.       Updates/Grading for next session: TBD pending progress       CHAPARRITA Castro

## 2024-06-10 ENCOUNTER — CLINICAL SUPPORT (OUTPATIENT)
Dept: REHABILITATION | Facility: HOSPITAL | Age: 54
End: 2024-06-10
Payer: COMMERCIAL

## 2024-06-10 DIAGNOSIS — R29.898 DECREASED GRIP STRENGTH OF RIGHT HAND: ICD-10-CM

## 2024-06-10 DIAGNOSIS — M67.90 TENDINOPATHY: ICD-10-CM

## 2024-06-10 DIAGNOSIS — Z02.6 ENCOUNTER RELATED TO WORKER'S COMPENSATION CLAIM: Primary | ICD-10-CM

## 2024-06-10 DIAGNOSIS — M25.641 DECREASED RANGE OF MOTION OF FINGER OF RIGHT HAND: ICD-10-CM

## 2024-06-10 DIAGNOSIS — S66.811A STRAIN OF OTHER SPECIFIED MUSCLES, FASCIA AND TENDONS AT WRIST AND HAND LEVEL, RIGHT HAND, INITIAL ENCOUNTER: ICD-10-CM

## 2024-06-10 PROCEDURE — 97110 THERAPEUTIC EXERCISES: CPT | Mod: PN

## 2024-06-10 PROCEDURE — 97035 APP MDLTY 1+ULTRASOUND EA 15: CPT | Mod: PN

## 2024-06-10 PROCEDURE — 97010 HOT OR COLD PACKS THERAPY: CPT | Mod: PN

## 2024-06-10 PROCEDURE — 97022 WHIRLPOOL THERAPY: CPT | Mod: PN

## 2024-06-10 NOTE — PROGRESS NOTES
"[facility] Worker's Compensation  Occupational Therapy Treatment Note     Date: 6/10/2024  Name: Carrie Carpenter  Clinic Number: [id number]    Therapy Diagnosis:   Encounter Diagnoses   Name Primary?    Encounter related to worker's compensation claim Yes    Strain of other specified muscles, fascia and tendons at wrist and hand level, right hand, initial encounter     Tendinopathy     Decreased range of motion of finger of right hand     Decreased  strength of right hand      Physician: Shira Powers MD    Physician Orders: Eval and treat  Medical Diagnosis from Referral:  Z02.6 (ICD-10-CM) - Encounter related to worker's compensation claim  S66.811A (ICD-10-CM) - Strain of other specified muscles, fascia and tendons at wrist and hand level, right hand, initial encounter  M67.90 (ICD-10-CM) - Tendinopathy  Evaluation Date: 5/9/2024  Authorization Period Expiration: 4/9/25  Plan of Care Expiration: 8/9/24; 6 visits on POC   Progress Note Due: 7/3/24  Visit # / Visits authorized: 5/12  (# of No Show Appts 0/Number of Cancelled Appts 0)     Time In: 13:45  Time Out: 14:25  Total Billable Time: 40 minutes    Occupation (including job description if provided):  at [employer]  Job Demands: manipulating small pieces of equipment, repetitive fine motor tasks such as screwing pieces of plastic together  Current Work Restrictions: light duty     Subjective     Pt reports: "No change with my hand pain since last week. It's not catching at work- just at home. In the morning when I wake up, I can't bend my finger."     She was compliant with home exercise program given last session.     Response to previous treatment: favorable    Functional change: Decreased long finger pain     Pain: 4/10 with passive flexion due to stiffness   Location: right long finger only     Objective     Objective Measures updated at progress report unless specified.    Treatment     Carrie received the " treatments listed below:      -Moist heat application to R hand x 5 min to increase circulation and tissue extensibility     Direct contact modalities after being cleared for contraindications:   -Utrasound for scar tissue remodeling, increased circulation, & tissue elasticity @ 100% duty cycle, 3.3 Mhz, applied to L volar palm/finger, intensity = 0.6 w/cm2 for 8 minutes. Patient tolerated treatment well without adverse effects. Therapist was in attendance throughout intervention.    Supervised modalities & therapeutic exercises after being cleared for contradictions x 27 minutes   -Fluidotherapy to R hand for 10 minutes to increase blood flow, circulation, desensitization, sensory re-education and for pain management. Performed the following therex within fluidotherapy: digit flexion/extension/ABD/ADD  -Lateral/key and 2 pt pinches with red weighted clothespin x 3 min each   -EDC strengthening with Digiextend (red) x 3 min   -Rest breaks as needed     Patient Education and Home Exercises     Education provided:   - Importance of compliance w/ HEP to maximize gains   - Progress towards goals     Written Home Exercises Provided: Continue with previous HEP.   Exercises were reviewed and Butler was able to demonstrate them prior to the end of the session.  Butler demonstrated good  understanding of the HEP provided. See EMR under Patient Instructions for exercises provided during previous therapy sessions.       Assessment     Pt would continue to benefit from skilled OT. Pt tolerated session well, noting no increased long finger triggering or hand pain reported with today's exercises. Pt mainly reports long finger pain and triggering at home, upon waking, and when trying to push her finger into full flexion.     The patient's current job specific task deficits include the following:   -Activities that require prolonged gripping and pinching   -Twisting/screwing small pieces    Butler is progressing well towards her goals  and there are no updates to goals at this time. Pt prognosis is Good.     Pt will continue to benefit from skilled Outpatient Occupational Therapy to address the deficits listed in the problem list on initial evaluation provide Pt/family education and to maximize Pt's level of independence in the home and community environment.     Pt's spiritual, cultural and educational needs considered and pt agreeable to plan of care and goals.      Anticipated barriers to occupational therapy: Repetitive nature of job      ShortTerm Goals (to be met in 3 weeks or by 5/30/24):   1. Patient to be IND and compliant with HEP & attendance for duration of therapy. In progress 6/3  2. Patient will demonstrate increased HILL in middle/ring/small fingers by 10 degrees to restore functional hand use for ADLs and work-related activities. Goal Met 6/3  3. Assess  and functional pinches and amend LTG. Goal Met 6/3  4. Patient will report no more than 3/10 pain in R fingers/hand. Goal Met 6/3        Updated Long Term Goals (to be met in 6 weeks or by DC):   1. Patient to be IND and compliant with HEP & attendance for duration of therapy.  2. Patient will demonstrate increased HILL in R middle finger within 5 degrees of her L middle finger HILL to increase functional hand use for ADLs and work-related tasks.   3. Patient will report no more than 1/10 pain in R fingers/hand.   4. Patient will demonstrate increased R  strength by 3-5 lbs.   5. Patient will demonstrate increased R pinch strength (3 positions) by 1-2 psi's.      Plan     Patient is to be treated by Occupational Therapy 1 time per week for 6 weeks, or 6 visits, during the certification period from 5/9/24 to 8/9/24 to achieve the established goals.       Updates/Grading for next session: TBD pending progress/Re-Assessment       CHAPARRITA Castro

## 2024-06-14 ENCOUNTER — OFFICE VISIT (OUTPATIENT)
Dept: URGENT CARE | Facility: CLINIC | Age: 54
End: 2024-06-14
Payer: COMMERCIAL

## 2024-06-14 VITALS
BODY MASS INDEX: 30.63 KG/M2 | RESPIRATION RATE: 18 BRPM | HEART RATE: 88 BPM | OXYGEN SATURATION: 98 % | WEIGHT: 156 LBS | HEIGHT: 60 IN | SYSTOLIC BLOOD PRESSURE: 121 MMHG | DIASTOLIC BLOOD PRESSURE: 72 MMHG

## 2024-06-14 DIAGNOSIS — S66.811D STRAIN OF OTHER SPECIFIED MUSCLES, FASCIA AND TENDONS AT WRIST AND HAND LEVEL, RIGHT HAND, SUBSEQUENT ENCOUNTER: Primary | ICD-10-CM

## 2024-06-14 DIAGNOSIS — Z02.6 ENCOUNTER RELATED TO WORKER'S COMPENSATION CLAIM: ICD-10-CM

## 2024-06-14 DIAGNOSIS — M67.90 TENDINOPATHY: ICD-10-CM

## 2024-06-14 PROCEDURE — 99213 OFFICE O/P EST LOW 20 MIN: CPT | Mod: S$GLB,,, | Performed by: PHYSICIAN ASSISTANT

## 2024-06-14 NOTE — LETTER
[facility]  [address] [address]  Phone: [phone]  Fax: [phone]  [facility]: [id number]    Pt Name: Carrie Carpenter  Injury Date: 04/09/2024   Employee ID: [id number] Date of Treatment: 06/14/2024   Company: [employer]      Appointment Time: 12:30 PM Arrived: 12:39 PM   Provider: Daisy Nobles PA-C Time Out:1:15 PM      Office Treatment:   1. Encounter related to worker's compensation claim               Restrictions: Regular Duty, Discharged from Occupational Health     Return Appointment: PRN    Return if symptoms worsen or fail to improve.    KW

## 2024-06-14 NOTE — PROGRESS NOTES
Subjective:      Patient ID: Carrie Carpenter is a 53 y.o. female.    Chief Complaint: Hand Pain    Ms. Carpenter presents for follow up of right hand injury, DOI 4/9/24.  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  She reports mild pain intermittently of the R 3rd digit and tightness with flexion and hand .  She reports she is doing well at regular duty at work.  She has been going to therapy.  At this point, she feels she has reached her baseline and would like to be discharged.    See MA note below.  Patient's place of employment - [employer]  Patient's job title - assembly  Date of Injury - 04/09/2024  Body part injured - Right Hand   Current work status per last visit - regular duty  Improved, same, or worse - Improving  Pain Scale right now (1-10) -  2-3/10    Pt states of still having a tight sensation in the middle finger on the right hand when making a fist.       Constitution: Negative.   HENT: Negative.     Neck: neck negative.   Cardiovascular: Negative.    Respiratory: Negative.     Musculoskeletal:  Positive for pain, joint pain and abnormal ROM of joint. Negative for joint swelling.   Skin:  Negative for erythema.   Neurological:  Negative for numbness and tingling.     Objective:     Physical Exam  Vitals and nursing note reviewed.   Constitutional:       General: She is not in acute distress.     Appearance: Normal appearance.   HENT:      Head: Normocephalic.   Eyes:      General: Lids are normal.      Conjunctiva/sclera: Conjunctivae normal.   Musculoskeletal:      Right wrist: Normal. No swelling or deformity.      Right hand: Tenderness present. No swelling, deformity or bony tenderness. Decreased range of motion. Normal pulse.      Cervical back: No pain with movement.      Comments: No swelling to the right hand or fingers.  No ecchymosis.  There is very slight dec in ROM of the right 3rd digit at the PIP joint.  Otherwise, normal ROM.    Mild TTP to MCP of R 3rd digit.  There is no popping or locking of the  joints of the fingers with ROM.  RUE is NVI.   Skin:     General: Skin is warm.      Capillary Refill: Capillary refill takes less than 2 seconds.      Findings: No abrasion, abscess, bruising, ecchymosis, erythema or wound.   Neurological:      General: No focal deficit present.      Mental Status: She is alert.   Psychiatric:         Attention and Perception: Attention normal.         Mood and Affect: Mood and affect normal.         Speech: Speech normal.         Behavior: Behavior normal. Behavior is cooperative.      Assessment:      1. Strain of other specified muscles, fascia and tendons at wrist and hand level, right hand, subsequent encounter    2. Tendinopathy    3. Encounter related to worker's compensation claim      Plan:     Ms. Carpenter feels her right finger stiffness/discomfort is at her baseline.  She will continue HEP and moist heat PRN.  She will be discharged from [facility] with PRN follow up.         Restrictions: Regular Duty, Discharged from Occupational Health  Follow up if symptoms worsen or fail to improve.

## 2024-06-17 ENCOUNTER — CLINICAL SUPPORT (OUTPATIENT)
Dept: REHABILITATION | Facility: HOSPITAL | Age: 54
End: 2024-06-17
Payer: COMMERCIAL

## 2024-06-17 DIAGNOSIS — M67.90 TENDINOPATHY: ICD-10-CM

## 2024-06-17 DIAGNOSIS — R29.898 DECREASED GRIP STRENGTH OF RIGHT HAND: ICD-10-CM

## 2024-06-17 DIAGNOSIS — Z02.6 ENCOUNTER RELATED TO WORKER'S COMPENSATION CLAIM: Primary | ICD-10-CM

## 2024-06-17 DIAGNOSIS — M25.641 DECREASED RANGE OF MOTION OF FINGER OF RIGHT HAND: ICD-10-CM

## 2024-06-17 DIAGNOSIS — S66.811A STRAIN OF OTHER SPECIFIED MUSCLES, FASCIA AND TENDONS AT WRIST AND HAND LEVEL, RIGHT HAND, INITIAL ENCOUNTER: ICD-10-CM

## 2024-06-17 PROCEDURE — 97530 THERAPEUTIC ACTIVITIES: CPT | Mod: PN

## 2024-06-17 NOTE — PROGRESS NOTES
"[facility] Worker's Compensation  Occupational Therapy Progress Note/Discharge Summary     Date: 6/17/2024  Name: Carrie Carpenter  Clinic Number: [id number]    Therapy Diagnosis:   Encounter Diagnoses   Name Primary?    Encounter related to worker's compensation claim Yes    Strain of other specified muscles, fascia and tendons at wrist and hand level, right hand, initial encounter     Tendinopathy     Decreased range of motion of finger of right hand     Decreased  strength of right hand      Physician: Shira Powers MD    Physician Orders: Eval and treat  Medical Diagnosis from Referral:  Z02.6 (ICD-10-CM) - Encounter related to worker's compensation claim  S66.811A (ICD-10-CM) - Strain of other specified muscles, fascia and tendons at wrist and hand level, right hand, initial encounter  M67.90 (ICD-10-CM) - Tendinopathy  Evaluation Date: 5/9/2024  Authorization Period Expiration: 4/9/25  Plan of Care Expiration: 8/9/24; 6 visits on POC   Progress Note Due: 7/3/24  Visit # / Visits authorized: 6/12  (# of No Show Appts 0/Number of Cancelled Appts 0)     Time In: 13:50  Time Out: 14:08  Total Billable Time: 18 minutes    Occupation (including job description if provided):  at [employer]  Job Demands: manipulating small pieces of equipment, repetitive fine motor tasks such as screwing pieces of plastic together  Current Work Restrictions: light duty     Subjective     Pt reports: "The locking of my finger is the same. It's just at home at night. I feel like I'm doing fine. I don't need therapy."     She was compliant with home exercise program given last session.     Response to previous treatment: favorable    Functional change: Decreased long finger pain     Pain: 1/10-3/10 with passive flexion due to stiffness   Location: right long finger only     Objective     Objective Measures updated at progress report unless specified.    Upper Extremity Range of Motion - R " hand (measured in degrees)   AROM  Middle  Ring  Small   MP 0/95 0/85 0/90   PIP 0/100 0/100 0/95   DIP  0/70 0/70 0/80   HILL  265   250  (+15) 265  (+10)      L MF HILL: 270  LRF HILL:  265  L SF HILL: 270      and Pinch Strength (in pounds, psi's):    Left Right    II 33  22.4 (+3 )   Lateral/key 14   12 (+2 )   3 pt  15  12 (+2)   2 pt  14 12 (same)      Treatment     Butler received the treatments listed below:      Re-Assessment performed with measurements and report taken, goals updated, review of home program x 18 minutes      Patient Education and Home Exercises     Education provided:   - Progress towards goals     Written Home Exercises Provided: Continue with previous HEP.   Home program was reviewed. Butler demonstrated good  understanding of the HEP provided. See EMR under Patient Instructions for exercises provided during previous therapy sessions.       Assessment     Pt has met all LTG established for therapy, noting normative L long finger ROM demonstrated as well as increased functional  and pinches. 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     The patient's current job specific task deficits include the following:   -Activities that require prolonged gripping and pinching   -Twisting/screwing small pieces    Pt's spiritual, cultural and educational needs considered and pt agreeable to plan of care and goals.    ShortTerm Goals (to be met in 3 weeks or by 5/30/24):   1. Patient to be IND and compliant with HEP & attendance for duration of therapy. In progress 6/3  2. Patient will demonstrate increased HILL in middle/ring/small fingers by 10 degrees to restore functional hand use for ADLs and work-related activities. Goal Met 6/3  3. Assess  and functional pinches and amend LTG. Goal Met 6/3  4. Patient will report no more than 3/10 pain in R fingers/hand. Goal Met 6/3         Updated Long Term Goals (to be met in 6 weeks or by DC):   1. Patient to be IND and compliant with HEP & attendance for duration of therapy. Goal Met 6/17  2. Patient will demonstrate increased HILL in R middle finger within 5 degrees of her L middle finger HILL to increase functional hand use for ADLs and work-related tasks. Goal Met 6/17  3. Patient will report no more than 1/10 pain in R fingers/hand. Goal Met 6/17  4. Patient will demonstrate increased R  strength by 3-5 lbs. Goal Met 6/17  5. Patient will demonstrate increased R pinch strength (3 positions) by 1-2 psi's. Goal Met 6/17     Plan     Patient is appropriate for discharge from Outpatient Occupational Therapy at this time and will continue at home with HEP focusing on modality use and stretching of stiff finger daily due to repetitive tasks required at her workplace.     CHAPARRITA Castro

## 2024-12-12 NOTE — PROGRESS NOTES
"[facility] Worker's Compensation  Occupational Therapy Treatment Note     Date: 5/20/2024  Name: Carrie Carpenter  Clinic Number: [id number]    Therapy Diagnosis:   Encounter Diagnoses   Name Primary?    Encounter related to worker's compensation claim Yes    Strain of other specified muscles, fascia and tendons at wrist and hand level, right hand, initial encounter     Decreased range of motion of finger of right hand     Decreased  strength of right hand      Physician: Shira Powers MD    Physician Orders: Eval and treat  Medical Diagnosis from Referral:  Z02.6 (ICD-10-CM) - Encounter related to worker's compensation claim  S66.811A (ICD-10-CM) - Strain of other specified muscles, fascia and tendons at wrist and hand level, right hand, initial encounter  M67.90 (ICD-10-CM) - Tendinopathy  Evaluation Date: 5/9/2024  Authorization Period Expiration: 4/9/25  Plan of Care Expiration: 8/9/24  Progress Note Due: 6/9/24  Visit # / Visits authorized: 3/12  (# of No Show Appts 0/Number of Cancelled Appts 0)     Time In: 17:25  Time Out: 18:10  Total Billable Time: 45 minutes    Occupation (including job description if provided):  at [employer]  Job Demands: manipulating small pieces of equipment, repetitive fine motor tasks such as screwing pieces of plastic together  Current Work Restrictions: light duty     Subjective     Pt reports: "I just try to keep my middle finger straight while I'm at work so that I don't use it. It's really stiff."     She was not compliant with home exercise program given last session. Note: Pt has not been wearing the custom trigger finger orthosis fabricated last session .     Response to previous treatment:N/A    Functional change: N/A    Pain: 6/10 with passive flexion due to stiffness   Location: right long finger only     Objective     Objective Measures updated at progress report unless specified.    Treatment     Butler received the " treatments listed below:      Therapeutic activities to improve functional performance for 30 minutes, including:  -Extensive review of previously provided education on importance of compliance with orthosis wear to reduce incidence of long finger triggering, joint protection and activity modification measures while at work, and HEP    Manual therapy techniques: STM and PROM were applied to the: R hand for 15 minutes, including:  -Gentle traction and passive tendon gliding to digits 3-5     Patient Education and Home Exercises     Education provided:   - Importance of compliance w/ HEP to maximize gains   - Progress towards goals     Written Home Exercises Provided: Yes.   Exercises were reviewed and Butler was able to demonstrate them prior to the end of the session.  Butler demonstrated good  understanding of the HEP provided. See EMR under Patient Instructions for exercises provided during previous and today's therapy sessions.       Assessment     Pt would continue to benefit from skilled OT. Pt tolerated session fairly well, noting moderate to high pain reported today w/ PROM flexion and one incidence of long finger triggering. Pt has not worn her trigger finger orthosis as instructed last session, and avoids bending her ling finger. Pt was reminded that wearing the orthosis will allow her to use her R hand while protecting the affected structures (A1 pulley and/or FDS tendon). Pt verbalized understanding of same.     The patient's current job specific task deficits include the following:   -Activities that require prolonged gripping and pinching   -Twisting/screwing small pieces     Butler is progressing well towards her goals and there are no updates to goals at this time. Pt prognosis is Good.     Pt will continue to benefit from skilled Outpatient Occupational Therapy to address the deficits listed in the problem list on initial evaluation provide Pt/family education and to maximize Pt's level of independence in  the home and community environment.     Pt's spiritual, cultural and educational needs considered and pt agreeable to plan of care and goals.      Anticipated barriers to occupational therapy: Repetitive nature of job      ShortTerm Goals (to be met in 3 weeks or by 5/30/24):   1. Patient to be IND and compliant with HEP & attendance for duration of therapy.  2. Patient will demonstrate increased HILL in middle/ring/small fingers by 10 degrees to restore functional hand use for ADLs and work-related activities.   3. Assess  and functional pinches and amend LTG.   4. Patient will report no more than 3/10 pain in R fingers/hand.         Long Term Goals (to be met in 6 weeks or by DC):   1. Patient to be IND and compliant with HEP & attendance for duration of therapy.  2. Patient will demonstrate increased HILL in R middle finger within 5 degrees of her L middle finger HILL to increase functional hand use for ADLs and work-related tasks.   3. Patient will report no more than 1/10 pain in R fingers/hand.      Plan     Patient is to be treated by Occupational Therapy 1 time per week for 6 weeks, or 6 visits, during the certification period from 5/9/24 to 8/9/24 to achieve the established goals.       Updates/Grading for next session: TBD pending progress       CHAPARRITA Castro            Detail Level: Zone

## 2025-01-07 ENCOUNTER — CLINICAL SUPPORT (OUTPATIENT)
Dept: OTHER | Facility: CLINIC | Age: 55
End: 2025-01-07

## 2025-01-07 DIAGNOSIS — Z00.8 ENCOUNTER FOR OTHER GENERAL EXAMINATION: ICD-10-CM

## 2025-01-08 VITALS
SYSTOLIC BLOOD PRESSURE: 124 MMHG | WEIGHT: 154 LBS | BODY MASS INDEX: 27.29 KG/M2 | HEIGHT: 63 IN | DIASTOLIC BLOOD PRESSURE: 80 MMHG

## 2025-01-08 LAB
HDLC SERPL-MCNC: 43 MG/DL
POC CHOLESTEROL, LDL (DOCK): 104 MG/DL
POC CHOLESTEROL, TOTAL: 172 MG/DL
POC GLUCOSE, FASTING: 110 MG/DL (ref 60–110)
TRIGL SERPL-MCNC: 139 MG/DL

## 2025-04-22 ENCOUNTER — OFFICE VISIT (OUTPATIENT)
Dept: OBSTETRICS AND GYNECOLOGY | Facility: CLINIC | Age: 55
End: 2025-04-22
Payer: COMMERCIAL

## 2025-04-22 VITALS
BODY MASS INDEX: 27.34 KG/M2 | SYSTOLIC BLOOD PRESSURE: 130 MMHG | DIASTOLIC BLOOD PRESSURE: 64 MMHG | WEIGHT: 154.31 LBS | HEIGHT: 63 IN

## 2025-04-22 DIAGNOSIS — Z12.31 VISIT FOR SCREENING MAMMOGRAM: ICD-10-CM

## 2025-04-22 DIAGNOSIS — Z01.419 WELL WOMAN EXAM WITH ROUTINE GYNECOLOGICAL EXAM: Primary | ICD-10-CM

## 2025-04-22 PROCEDURE — 3008F BODY MASS INDEX DOCD: CPT | Mod: CPTII,S$GLB,, | Performed by: OBSTETRICS & GYNECOLOGY

## 2025-04-22 PROCEDURE — 3078F DIAST BP <80 MM HG: CPT | Mod: CPTII,S$GLB,, | Performed by: OBSTETRICS & GYNECOLOGY

## 2025-04-22 PROCEDURE — 99396 PREV VISIT EST AGE 40-64: CPT | Mod: S$GLB,,, | Performed by: OBSTETRICS & GYNECOLOGY

## 2025-04-22 PROCEDURE — 99999 PR PBB SHADOW E&M-EST. PATIENT-LVL III: CPT | Mod: PBBFAC,,, | Performed by: OBSTETRICS & GYNECOLOGY

## 2025-04-22 PROCEDURE — 4010F ACE/ARB THERAPY RXD/TAKEN: CPT | Mod: CPTII,S$GLB,, | Performed by: OBSTETRICS & GYNECOLOGY

## 2025-04-22 PROCEDURE — 1160F RVW MEDS BY RX/DR IN RCRD: CPT | Mod: CPTII,S$GLB,, | Performed by: OBSTETRICS & GYNECOLOGY

## 2025-04-22 PROCEDURE — 3075F SYST BP GE 130 - 139MM HG: CPT | Mod: CPTII,S$GLB,, | Performed by: OBSTETRICS & GYNECOLOGY

## 2025-04-22 PROCEDURE — 1159F MED LIST DOCD IN RCRD: CPT | Mod: CPTII,S$GLB,, | Performed by: OBSTETRICS & GYNECOLOGY

## 2025-04-22 NOTE — PROGRESS NOTES
Subjective:       Patient ID: Carrie Carpenter is a 54 y.o. female.    Chief Complaint:  Well Woman (Last normal pap with Negative HPV was 2022 and last normal mammogram was 2023.)      History of Present Illness  HPI  Annual Exam-Postmenopausal  Patient presents for annual exam. The patient has no GYN complaints today. No hot flashes, vaginal dryness or dyspareunia.  The patient is no longer sexually-active. GYN screening history: last pap: approximate date 2022 and was normal and last mammogram: approximate date 2023 and was normal. The patient is not taking hormone replacement therapy. Patient denies post-menopausal vaginal bleeding. The patient wears seatbelts: yes. The patient participates in regular exercise: no. Has the patient ever been transfused or tattooed?: no/no. The patient reports that there is not domestic violence in her life.     Previous  section and laparoscopic cholecystectomy  Last normal colonoscopy in       GYN & OB History  Patient's last menstrual period was 2020 (exact date).   Date of Last Pap: 6/10/2022    OB History    Para Term  AB Living   5 1 1  4 1   SAB IAB Ectopic Multiple Live Births   1 3   1      # Outcome Date GA Lbr Kb/2nd Weight Sex Type Anes PTL Lv   5 Term 00 40w0d  2.95 kg (6 lb 8.1 oz) M CS-LTranv Gen N NOAH   4 IAB            3 IAB            2 IAB            1 SAB              History reviewed. No pertinent past medical history.    Past Surgical History:   Procedure Laterality Date     SECTION, LOW TRANSVERSE  2000    CHOLECYSTECTOMY         Family History   Problem Relation Name Age of Onset    Hypertension Mother      Hyperlipidemia Mother      Diabetes Father      Hypertension Father         Social History     Socioeconomic History    Marital status:    Tobacco Use    Smoking status: Never     Passive exposure: Never    Smokeless tobacco: Never   Substance and Sexual Activity    Alcohol use: No     Drug use: No    Sexual activity: Yes     Partners: Male     Birth control/protection: None   Social History Narrative     since 1998    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      diclofenac (VOLTAREN) 75 MG EC tablet Take 75 mg by mouth 2 (two) times daily as needed.      diclofenac sodium (VOLTAREN) 1 % Gel Apply 2 g topically 4 (four) times daily. 200 g 1    hydrOXYzine (ATARAX) 25 MG tablet Take 25 mg by mouth 4 (four) times daily.      irbesartan (AVAPRO) 300 MG tablet Take 300 mg by mouth once daily.      terbinafine HCL (LAMISIL) 250 mg tablet Take 250 mg by mouth.       No current facility-administered medications for this visit.       Review of patient's allergies indicates:  No Known Allergies      Review of Systems  Review of Systems   Constitutional:  Negative for activity change, appetite change, chills, fatigue, fever and unexpected weight change.   HENT:  Negative for mouth sores.    Respiratory:  Negative for cough, shortness of breath and wheezing.    Cardiovascular:  Negative for chest pain and palpitations.   Gastrointestinal:  Negative for abdominal pain, bloating, blood in stool, constipation, nausea and vomiting.   Endocrine: Negative for diabetes and hot flashes.   Genitourinary:  Negative for dysmenorrhea, dyspareunia, dysuria, frequency, hematuria, menorrhagia, menstrual problem, pelvic pain, urgency, vaginal bleeding, vaginal discharge, vaginal pain, urinary incontinence, postcoital bleeding and vaginal odor.   Musculoskeletal:  Negative for back pain and myalgias.   Integumentary:  Negative for rash, breast mass and nipple discharge.   Neurological:  Negative for seizures and headaches.   Psychiatric/Behavioral:  Negative for depression and sleep disturbance. The patient is not  nervous/anxious.    Breast: Negative for mass, mastodynia and nipple discharge          Objective:    Physical Exam:   Constitutional: She appears well-developed and well-nourished. No distress.   BMI of 27.34    HENT:   Head: Normocephalic and atraumatic.    Eyes: EOM are normal.      Pulmonary/Chest: Effort normal. No respiratory distress.   Breasts: Non-tender, no engorgement, no masses, no retraction, no discharge. Negative for lymphadenopathy.         Abdominal: Soft. She exhibits no distension. There is no abdominal tenderness. There is no rebound and no guarding.   Pfannenstiel scar  No obvious point tenderness     Genitourinary:    Vagina and uterus normal.   No vaginal discharge in the vagina.    Genitourinary Comments: Vulva without any obvious lesions.  Urethral meatus normal size and location without any lesion.  Urethra is non-tender without stricture or discharge.  Bladder is non-tender.  Vaginal vault with good support.  Minimal white discharge noted.  No obvious lesion.  Normal rugation.  Cervix is without any cervical motion tenderness.  No obvious lesion.  Uterus is small, non-tender, normal contour.  Adnexa is without any masses or tenderness.  Perineum without obvious lesion.               Musculoskeletal: Normal range of motion.       Neurological: She is alert.    Skin: Skin is warm and dry.    Psychiatric: She has a normal mood and affect.          Assessment:        1. Well woman exam with routine gynecological exam    2. Visit for screening mammogram    3.  Menopause          Plan:          I have discussed with the patient her condition.  Monthly breast examination was instructed, discussed, and encouraged.  Patient was encouraged to consume a low-calorie, low fat diet, and to increase of physical activity.  Healthy habits encouraged.  A Pap smear was performed with HRHPV according to the USPSTF recommendations.  Mammogram was ordered because of the combination of her age and risk factors,  according to ACOG guidelines.  Gonorrhea and Chlamydia testing not performed;  HIV test not offered, again according to guidelines.  Colonoscopy discussed according to ACS guideline.  We also discussed her obstetric history and CV risks.   Care Gaps addressed.  She will get her shingle vaccine soon  Patient is to continue her medications as prescribed.   She will come back to see me in one year for her annual visit.  She can come back to see me sooner as necessary.  All of her questions were answered appropriately to her satisfaction.          ** A female chaperone, Brooklyn Yo, was present for the pelvic exam

## 2025-04-29 ENCOUNTER — HOSPITAL ENCOUNTER (OUTPATIENT)
Dept: RADIOLOGY | Facility: HOSPITAL | Age: 55
Discharge: HOME | End: 2025-04-29
Attending: OBSTETRICS & GYNECOLOGY
Payer: COMMERCIAL

## 2025-04-29 DIAGNOSIS — Z12.31 VISIT FOR SCREENING MAMMOGRAM: ICD-10-CM

## 2025-04-29 PROCEDURE — 77063 BREAST TOMOSYNTHESIS BI: CPT | Mod: 26,,, | Performed by: RADIOLOGY

## 2025-04-29 PROCEDURE — 77067 SCR MAMMO BI INCL CAD: CPT | Mod: 26,,, | Performed by: RADIOLOGY

## 2025-04-29 PROCEDURE — 77067 SCR MAMMO BI INCL CAD: CPT | Mod: TC
